# Patient Record
Sex: MALE | Race: WHITE | NOT HISPANIC OR LATINO | Employment: OTHER | URBAN - METROPOLITAN AREA
[De-identification: names, ages, dates, MRNs, and addresses within clinical notes are randomized per-mention and may not be internally consistent; named-entity substitution may affect disease eponyms.]

---

## 2017-01-13 ENCOUNTER — OFFICE VISIT (OUTPATIENT)
Dept: NEUROSURGERY | Facility: CLINIC | Age: 72
End: 2017-01-13

## 2017-01-13 VITALS
WEIGHT: 193 LBS | BODY MASS INDEX: 24 KG/M2 | HEIGHT: 75 IN | SYSTOLIC BLOOD PRESSURE: 117 MMHG | DIASTOLIC BLOOD PRESSURE: 65 MMHG | HEART RATE: 60 BPM

## 2017-01-13 DIAGNOSIS — M48.061 SPINAL STENOSIS OF LUMBAR REGION: Primary | ICD-10-CM

## 2017-01-13 DIAGNOSIS — M51.37 DEGENERATION OF INTERVERTEBRAL DISC OF LUMBOSACRAL REGION: ICD-10-CM

## 2017-01-13 PROCEDURE — 99213 OFFICE O/P EST LOW 20 MIN: CPT | Performed by: NEUROLOGICAL SURGERY

## 2017-01-13 NOTE — LETTER
January 13, 2017     Riky Vincent MD  [address] [place] [id number]    Patient: Jhonatan Norman   YOB: 1945   Date of Visit: 1/13/2017       Dear Dr. Carlton MD:    Jhonatan Norman was in my office today. Below are the relevant portions of my assessment and plan of care.      Independent Review of Radiographic Studies:    The lumbar MRI done 12/14/16 as well as the plain x-rays show some mild degree of progression of stenosis at L4-L5 and L5-S1.  He does have a transitional segment.  I think there is a little bit of progression of stenosis at L3-L4.  I agree with the report.  But not overall extremely different compared to 2012.      Medical Decision Making:    Again nothing surgical at this time.  He will continue exercises and flexibility and core strengthening.  We'll check up on him in 6 months.      Jhonatan was seen today for back pain.    Diagnoses and all orders for this visit:    Spinal stenosis of lumbar region    Degeneration of intervertebral disc of lumbosacral region    Return in about 6 months (around 7/13/2017).            If you have questions, please do not hesitate to call me. I look forward to following Jhonatan along with you.         Sincerely,        Perico Khalil MD        CC: Calos Hyman, DO

## 2017-01-13 NOTE — MR AVS SNAPSHOT
Jhonatan Norman   1/13/2017 9:00 AM   Office Visit    Dept Phone:  [phone]   Encounter #:  [id number]    Provider:  Perico Khalil MD   Department:  [facility] NEUROSURGERY                Your Full Care Plan              Your Updated Medication List          This list is accurate as of: 1/13/17 10:31 AM.  Always use your most recent med list.                JUBLIA 10 % solution   Generic drug:  Efinaconazole       NAPROXEN  MG EC tablet   Generic drug:  naproxen               You Were Diagnosed With        Codes Comments    Spinal stenosis of lumbar region    -  Primary ICD-10-CM: M48.06  ICD-9-CM: 724.02     Degeneration of intervertebral disc of lumbosacral region     ICD-10-CM: M51.37  ICD-9-CM: 722.52       Instructions     None    Patient Instructions History      Upcoming Appointments     Visit Type Date Time Department    FOLLOW UP 1/13/2017  9:00 AM [facility] NEUROSURGERY [facility]    FOLLOW UP 7/10/2017  8:45 AM [facility] NEUROSURGERY [facility]      [facility] Signup     Our records indicate that you have an active [employer] account.    You can view your After Visit Summary by going to [employer] and logging in with your [facility] username and password.  If you don't have a [facility] username and password but a parent or guardian has access to your record, the parent or guardian should login with their own [facility] username and password and access your record to view the After Visit Summary.    If you have questions, you can email [email or web address] or call [phone] to talk to our [facility] staff.  Remember, [facility] is NOT to be used for urgent needs.  For medical emergencies, dial 911.               Other Info from Your Visit           Your Appointments     Jul 10, 2017  8:45 AM EDT   Follow Up with Perico Khalil MD   [facility] NEUROSURGERY (--)    [address] [facility] [address]  [facility] [address]   [phone]           "Arrive 15 minutes prior to appointment.              Allergies     No Known Allergies      Reason for Visit     Back Pain           Vital Signs     Blood Pressure Pulse Height Weight Body Mass Index Smoking Status    117/65 60 75\" (190.5 cm) 193 lb (87.5 kg) 24.12 kg/m2 Never Smoker      Problems and Diagnoses Noted     Degeneration of lumbar or lumbosacral intervertebral disc    Narrowing of spinal canal        "

## 2017-01-13 NOTE — PROGRESS NOTES
Subjective   Patient ID: Jhonatan Norman is a 71 y.o. male is here today for follow-up of back pain with new MRI and x-rays of the lumbar spine.    The patient denies any changes to his symptoms since his last visit.    Back Pain   This is a chronic problem. The current episode started more than 1 month ago. The problem occurs daily. The problem is unchanged.   Neurologic Problem   The patient's primary symptoms include focal weakness. This is a chronic problem. The current episode started more than 1 year ago. The neurological problem developed gradually. The problem has been gradually worsening since onset. There was lower extremity focality noted. Associated symptoms include back pain. The treatment provided significant relief.       The following portions of the patient's history were reviewed and updated as appropriate: allergies, current medications, past family history, past medical history, past social history, past surgical history and problem list.    Review of Systems   Musculoskeletal: Positive for back pain.   Neurological: Positive for focal weakness.   All other systems reviewed and are negative.    He is back to discuss his MRI and plain x-rays.  There is been some degree of progression of her for years but not a lot.  He still has the known stenosis at L4-L5 and L5-S1 without significant listhesis.  I think is a little bit of progression at L3-L4.  He has a transitional segment.  It's not so much pain as it is numbness and tingling particularly in his feet.  He has a chronic gastrocnemius weakness on the left.  There was nothing that suggested a need for any kind of surgery.  I recommended that he continue working on home exercises and strengthening of his legs and core muscles.  We will see him in 6 months.        Objective   Physical Exam   Constitutional: He is oriented to person, place, and time. He appears well-developed and well-nourished.   HENT:   Head: Normocephalic and atraumatic.   Eyes:  Conjunctivae and EOM are normal. Pupils are equal, round, and reactive to light.   Fundoscopic exam:       The right eye shows no papilledema. The right eye shows venous pulsations.        The left eye shows no papilledema. The left eye shows venous pulsations.   Neck: Carotid bruit is not present.   Neurological: He is oriented to person, place, and time. He has a normal Finger-Nose-Finger Test and a normal Heel to Shin Test. Gait normal.   Reflex Scores:       Tricep reflexes are 2+ on the right side and 2+ on the left side.       Bicep reflexes are 2+ on the right side and 2+ on the left side.       Brachioradialis reflexes are 2+ on the right side and 2+ on the left side.       Patellar reflexes are 2+ on the right side and 2+ on the left side.       Achilles reflexes are 2+ on the right side and 2+ on the left side.  Psychiatric: His speech is normal.     Neurologic Exam     Mental Status   Oriented to person, place, and time.   Registration of memory: Good recent and remote memory.   Attention: normal. Concentration: normal.   Speech: speech is normal   Level of consciousness: alert  Knowledge: consistent with education.     Cranial Nerves     CN II   Visual fields full to confrontation.   Visual acuity: normal    CN III, IV, VI   Pupils are equal, round, and reactive to light.  Extraocular motions are normal.     CN V   Facial sensation intact.   Right corneal reflex: normal  Left corneal reflex: normal    CN VII   Facial expression full, symmetric.   Right facial weakness: none  Left facial weakness: none    CN VIII   Hearing: intact    CN IX, X   Palate: symmetric    CN XI   Right sternocleidomastoid strength: normal  Left sternocleidomastoid strength: normal    CN XII   Tongue: not atrophic  Tongue deviation: none    Motor Exam   Muscle bulk: normal  Right arm tone: normal  Left arm tone: normal  Right leg tone: normal  Left leg tone: normal    Strength   Strength 5/5 except as noted.   Left gastroc:  3/5    Sensory Exam   Light touch normal.     Gait, Coordination, and Reflexes     Gait  Gait: normal    Coordination   Finger to nose coordination: normal  Heel to shin coordination: normal    Reflexes   Right brachioradialis: 2+  Left brachioradialis: 2+  Right biceps: 2+  Left biceps: 2+  Right triceps: 2+  Left triceps: 2+  Right patellar: 2+  Left patellar: 2+  Right achilles: 2+  Left achilles: 2+  Right : 2+  Left : 2+      Assessment/Plan   Independent Review of Radiographic Studies:    The lumbar MRI done 12/14/16 as well as the plain x-rays show some mild degree of progression of stenosis at L4-L5 and L5-S1.  He does have a transitional segment.  I think there is a little bit of progression of stenosis at L3-L4.  I agree with the report.  But not overall extremely different compared to 2012.      Medical Decision Making:    Again nothing surgical at this time.  He will continue exercises and flexibility and core strengthening.  We'll check up on him in 6 months.      Jhonatan was seen today for back pain.    Diagnoses and all orders for this visit:    Spinal stenosis of lumbar region    Degeneration of intervertebral disc of lumbosacral region    Return in about 6 months (around 7/13/2017).

## 2017-07-10 ENCOUNTER — OFFICE VISIT (OUTPATIENT)
Dept: NEUROSURGERY | Facility: CLINIC | Age: 72
End: 2017-07-10

## 2017-07-10 VITALS
SYSTOLIC BLOOD PRESSURE: 109 MMHG | HEIGHT: 75 IN | BODY MASS INDEX: 23.5 KG/M2 | HEART RATE: 55 BPM | DIASTOLIC BLOOD PRESSURE: 71 MMHG | WEIGHT: 189 LBS

## 2017-07-10 DIAGNOSIS — M51.37 DEGENERATION OF INTERVERTEBRAL DISC OF LUMBOSACRAL REGION: ICD-10-CM

## 2017-07-10 DIAGNOSIS — M48.061 SPINAL STENOSIS OF LUMBAR REGION: Primary | ICD-10-CM

## 2017-07-10 PROCEDURE — 99213 OFFICE O/P EST LOW 20 MIN: CPT | Performed by: NEUROLOGICAL SURGERY

## 2017-07-10 NOTE — PROGRESS NOTES
Subjective   Patient ID: Jhonatan Norman is a 71 y.o. male is here today for follow-up of back pain.    At the patient's last visit, he was encouraged to continue exercising.    Today, the patient notes that he is stable at this time.  He notes that his pain symptoms are improved since his last visit.  He notes that he has been taking it easy and not doing any heavy lifting.  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    He notes that his upper back pain symptoms have improved as well.    Back Pain   This is a chronic problem. The current episode started more than 1 month ago. The problem occurs daily. The problem has been gradually improving since onset. Pertinent negatives include no fever.       The following portions of the patient's history were reviewed and updated as appropriate: allergies, current medications, past family history, past medical history, past social history, past surgical history and problem list.    Review of Systems   Constitutional: Negative for fever.   Musculoskeletal: Positive for back pain.   Neurological: Negative for syncope.   All other systems reviewed and are negative.    I been following this patient expectantly every so often.  He has known spinal stenosis at L4-L5 and L5-S1 as well as L3-L4.  He has chronic left gastrocnemius weakness.  We have been trying to avoid surgery.  He's been doing exercises and we seem to be reasonably successful in managing this nonoperatively.  He will continue doing his exercises and stretches and I'll see him in 9 months.      Objective   Physical Exam   Constitutional: He is oriented to person, place, and time. He appears well-developed and well-nourished.   HENT:   Head: Normocephalic and atraumatic.   Eyes: Conjunctivae and EOM are normal. Pupils are equal, round, and reactive to light.   Fundoscopic exam:       The right eye shows no papilledema. The right eye shows venous pulsations.        The left eye shows no papilledema. The left eye shows  venous pulsations.   Neck: Carotid bruit is not present.   Neurological: He is oriented to person, place, and time. He has a normal Finger-Nose-Finger Test and a normal Heel to Shin Test. Gait normal.   Reflex Scores:       Tricep reflexes are 2+ on the right side and 2+ on the left side.       Bicep reflexes are 2+ on the right side and 2+ on the left side.       Brachioradialis reflexes are 2+ on the right side and 2+ on the left side.       Patellar reflexes are 2+ on the right side and 2+ on the left side.       Achilles reflexes are 2+ on the right side and 2+ on the left side.  Psychiatric: His speech is normal.     Neurologic Exam     Mental Status   Oriented to person, place, and time.   Registration of memory: Good recent and remote memory.   Attention: normal. Concentration: normal.   Speech: speech is normal   Level of consciousness: alert  Knowledge: consistent with education.     Cranial Nerves     CN II   Visual fields full to confrontation.   Visual acuity: normal    CN III, IV, VI   Pupils are equal, round, and reactive to light.  Extraocular motions are normal.     CN V   Facial sensation intact.   Right corneal reflex: normal  Left corneal reflex: normal    CN VII   Facial expression full, symmetric.   Right facial weakness: none  Left facial weakness: none    CN VIII   Hearing: intact    CN IX, X   Palate: symmetric    CN XI   Right sternocleidomastoid strength: normal  Left sternocleidomastoid strength: normal    CN XII   Tongue: not atrophic  Tongue deviation: none    Motor Exam   Muscle bulk: normal  Right arm tone: normal  Left arm tone: normal  Right leg tone: normal  Left leg tone: normal    Strength   Strength 5/5 except as noted.   Left gastroc: 3/5    Sensory Exam   Light touch normal.     Gait, Coordination, and Reflexes     Gait  Gait: normal    Coordination   Finger to nose coordination: normal  Heel to shin coordination: normal    Reflexes   Right brachioradialis: 2+  Left  brachioradialis: 2+  Right biceps: 2+  Left biceps: 2+  Right triceps: 2+  Left triceps: 2+  Right patellar: 2+  Left patellar: 2+  Right achilles: 2+  Left achilles: 2+  Right : 2+  Left : 2+      Assessment/Plan   Independent Review of Radiographic Studies:    The MRI done 12/14/16 shows spinal stenosis at L4-L5 and L5-S1 and some progression at L3-L4.      Medical Decision Making:    Overall doing reasonably well.  He'll continue his exercises.  Will stretch out the follow-up to 9 months.      Jhonatan was seen today for back pain.    Diagnoses and all orders for this visit:    Spinal stenosis of lumbar region    Degeneration of intervertebral disc of lumbosacral region    Return in about 9 months (around 4/10/2018).

## 2018-01-09 ENCOUNTER — OFFICE VISIT (OUTPATIENT)
Dept: GASTROENTEROLOGY | Facility: CLINIC | Age: 73
End: 2018-01-09

## 2018-01-09 VITALS
SYSTOLIC BLOOD PRESSURE: 112 MMHG | WEIGHT: 190.2 LBS | DIASTOLIC BLOOD PRESSURE: 66 MMHG | TEMPERATURE: 97.5 F | BODY MASS INDEX: 23.65 KG/M2 | HEIGHT: 75 IN

## 2018-01-09 DIAGNOSIS — K21.9 GASTROESOPHAGEAL REFLUX DISEASE, ESOPHAGITIS PRESENCE NOT SPECIFIED: Primary | ICD-10-CM

## 2018-01-09 PROCEDURE — 99202 OFFICE O/P NEW SF 15 MIN: CPT | Performed by: INTERNAL MEDICINE

## 2018-01-09 RX ORDER — SODIUM CHLORIDE, SODIUM LACTATE, POTASSIUM CHLORIDE, CALCIUM CHLORIDE 600; 310; 30; 20 MG/100ML; MG/100ML; MG/100ML; MG/100ML
30 INJECTION, SOLUTION INTRAVENOUS CONTINUOUS
Status: CANCELLED | OUTPATIENT
Start: 2018-01-09

## 2018-01-09 RX ORDER — DEXLANSOPRAZOLE 60 MG/1
60 CAPSULE, DELAYED RELEASE ORAL DAILY
Refills: 1 | COMMUNITY
Start: 2017-12-08 | End: 2018-04-09

## 2018-01-09 RX ORDER — MELATONIN
5000 DAILY
COMMUNITY
End: 2019-01-09

## 2018-01-09 NOTE — PROGRESS NOTES
Chief Complaint   Patient presents with   • Heartburn   • Abdominal Pain        Jhonatan Norman is a  72 y.o. male here for an initial visit for GERD    HPI This 72-year-old white male patient of Dr. Aidan Ross presents with complaints of reflux that he recently attributed to carbonated beverages.  He had been studied with upper endoscopy in February 2013 as well as colonoscopy with evidence of previous vagotomy and pyloroplasty.  He had been treated at that time with Nexium and subsequently Dexilant which did afford significant relief.  He tried over-the-counter equivalent lansoprazole 60 mg which did improve his reflux but since initiating this medication has had cold-like symptoms that may be associated with the medication.  He has completed an 8 week course of this and I offered upper endoscopic evaluation to assess his anatomy and to decide whether stopping his proton pump inhibitor is the appropriate next step to take.  We did talk about alternative methods of treatment i.e. alternative PPI, mucosal defense agent such as Carafate.  He would prefer to have examination prior to making adjustments.    Past Medical History:   Diagnosis Date   • Alcoholism    • GERD (gastroesophageal reflux disease)    • H/O pyloroplasty    • Hx of vagotomy        Current Outpatient Prescriptions   Medication Sig Dispense Refill   • cholecalciferol (VITAMIN D3) 1000 units tablet Take 5,000 Units by mouth Daily.     • CYANOCOBALAMIN IJ Inject  as directed.     • DEXILANT 60 MG capsule   1   • NAPROXEN  MG EC tablet   1     No current facility-administered medications for this visit.        PRN Meds:.    No Known Allergies    Social History     Social History   • Marital status:      Spouse name: N/A   • Number of children: N/A   • Years of education: college graduate     Occupational History   • Retired      Social History Main Topics   • Smoking status: Former Smoker     Packs/day: 2.00     Start date: 1958     Quit  date: 1997   • Smokeless tobacco: Never Used   • Alcohol use No      Comment: history of alcohol abuse   • Drug use: No   • Sexual activity: Not on file     Other Topics Concern   • Not on file     Social History Narrative       Family History   Problem Relation Age of Onset   • Cancer Other    • Ulcers Son    • Irritable bowel syndrome Daughter    • Diverticulitis Daughter        Review of Systems   Constitutional: Negative for activity change, appetite change, fatigue and unexpected weight change.   HENT: Negative for congestion, facial swelling, sore throat, trouble swallowing and voice change.    Eyes: Negative for photophobia and visual disturbance.   Respiratory: Negative for cough and choking.    Cardiovascular: Negative for chest pain.   Gastrointestinal: Negative for abdominal distention, abdominal pain, anal bleeding, blood in stool, constipation, diarrhea, nausea, rectal pain and vomiting.        GERD   Endocrine: Negative for polyphagia.   Musculoskeletal: Negative for arthralgias, gait problem and joint swelling.   Skin: Negative for color change, pallor and rash.   Allergic/Immunologic: Negative for food allergies.   Neurological: Negative for speech difficulty and headaches.   Hematological: Does not bruise/bleed easily.   Psychiatric/Behavioral: Negative for agitation, confusion and sleep disturbance.       Vitals:    01/09/18 0924   BP: 112/66   Temp: 97.5 °F (36.4 °C)       Physical Exam   Constitutional: He is oriented to person, place, and time. He appears well-developed and well-nourished.   HENT:   Head: Normocephalic.   Mouth/Throat: Oropharynx is clear and moist.   Eyes: Conjunctivae and EOM are normal.   Neck: Normal range of motion.   Cardiovascular: Normal rate and regular rhythm.    Pulmonary/Chest: Breath sounds normal.   Abdominal: Soft. Bowel sounds are normal.   Musculoskeletal: Normal range of motion.   Neurological: He is alert and oriented to person, place, and time.   Skin: Skin  is warm and dry.   Psychiatric: He has a normal mood and affect. His behavior is normal.       ASSESSMENT   #1 GERD: Recent exacerbation, better with PPI  #2 cold like symptoms: Possible side effect associated with lansoprazole      PLAN  Schedule EGD      ICD-10-CM ICD-9-CM   1. Gastroesophageal reflux disease, esophagitis presence not specified K21.9 530.81

## 2018-01-17 ENCOUNTER — ANESTHESIA (OUTPATIENT)
Dept: GASTROENTEROLOGY | Facility: HOSPITAL | Age: 73
End: 2018-01-17

## 2018-01-17 ENCOUNTER — ANESTHESIA EVENT (OUTPATIENT)
Dept: GASTROENTEROLOGY | Facility: HOSPITAL | Age: 73
End: 2018-01-17

## 2018-01-17 ENCOUNTER — HOSPITAL ENCOUNTER (OUTPATIENT)
Facility: HOSPITAL | Age: 73
Setting detail: HOSPITAL OUTPATIENT SURGERY
Discharge: HOME | End: 2018-01-17
Attending: INTERNAL MEDICINE | Admitting: INTERNAL MEDICINE

## 2018-01-17 VITALS
HEART RATE: 54 BPM | TEMPERATURE: 98.1 F | WEIGHT: 191.5 LBS | BODY MASS INDEX: 23.81 KG/M2 | OXYGEN SATURATION: 97 % | RESPIRATION RATE: 14 BRPM | DIASTOLIC BLOOD PRESSURE: 72 MMHG | HEIGHT: 75 IN | SYSTOLIC BLOOD PRESSURE: 104 MMHG

## 2018-01-17 DIAGNOSIS — K21.9 GASTROESOPHAGEAL REFLUX DISEASE, ESOPHAGITIS PRESENCE NOT SPECIFIED: ICD-10-CM

## 2018-01-17 PROCEDURE — 43239 EGD BIOPSY SINGLE/MULTIPLE: CPT | Performed by: INTERNAL MEDICINE

## 2018-01-17 PROCEDURE — 87081 CULTURE SCREEN ONLY: CPT | Performed by: INTERNAL MEDICINE

## 2018-01-17 PROCEDURE — 25010000002 PROPOFOL 10 MG/ML EMULSION: Performed by: ANESTHESIOLOGY

## 2018-01-17 PROCEDURE — 88312 SPECIAL STAINS GROUP 1: CPT | Performed by: INTERNAL MEDICINE

## 2018-01-17 PROCEDURE — 88305 TISSUE EXAM BY PATHOLOGIST: CPT | Performed by: INTERNAL MEDICINE

## 2018-01-17 PROCEDURE — S0260 H&P FOR SURGERY: HCPCS | Performed by: INTERNAL MEDICINE

## 2018-01-17 RX ORDER — SODIUM CHLORIDE, SODIUM LACTATE, POTASSIUM CHLORIDE, CALCIUM CHLORIDE 600; 310; 30; 20 MG/100ML; MG/100ML; MG/100ML; MG/100ML
30 INJECTION, SOLUTION INTRAVENOUS CONTINUOUS
Status: DISCONTINUED | OUTPATIENT
Start: 2018-01-17 | End: 2018-01-17 | Stop reason: HOSPADM

## 2018-01-17 RX ORDER — PROPOFOL 10 MG/ML
VIAL (ML) INTRAVENOUS AS NEEDED
Status: DISCONTINUED | OUTPATIENT
Start: 2018-01-17 | End: 2018-01-17 | Stop reason: SURG

## 2018-01-17 RX ORDER — LIDOCAINE HYDROCHLORIDE 20 MG/ML
INJECTION, SOLUTION INFILTRATION; PERINEURAL AS NEEDED
Status: DISCONTINUED | OUTPATIENT
Start: 2018-01-17 | End: 2018-01-17 | Stop reason: SURG

## 2018-01-17 RX ADMIN — LIDOCAINE HYDROCHLORIDE 60 MG: 20 INJECTION, SOLUTION INFILTRATION; PERINEURAL at 16:10

## 2018-01-17 RX ADMIN — PROPOFOL 300 MG: 10 INJECTION, EMULSION INTRAVENOUS at 16:10

## 2018-01-17 RX ADMIN — SODIUM CHLORIDE, POTASSIUM CHLORIDE, SODIUM LACTATE AND CALCIUM CHLORIDE: 600; 310; 30; 20 INJECTION, SOLUTION INTRAVENOUS at 16:10

## 2018-01-17 RX ADMIN — SODIUM CHLORIDE, POTASSIUM CHLORIDE, SODIUM LACTATE AND CALCIUM CHLORIDE 30 ML/HR: 600; 310; 30; 20 INJECTION, SOLUTION INTRAVENOUS at 15:24

## 2018-01-17 NOTE — H&P
[facility]  Pre Procedure History & Physical    Chief Complaint:   GERD    Subjective     HPI:   This 72-year-old presents to the endoscopy suite for upper endoscopic evaluation.  He has a history of reflux.    Past Medical History:   Past Medical History:   Diagnosis Date   • Alcoholism    • GERD (gastroesophageal reflux disease)    • H/O pyloroplasty    • Hx of vagotomy    • Spinal stenosis        Past Surgical History:  Past Surgical History:   Procedure Laterality Date   • BACK SURGERY     • CHOLECYSTECTOMY     • COLONOSCOPY  02/28/2013    fair prep, tics, stool, torts, IH   • ENDOSCOPY  02/28/2013    Z line irregular, 45 cm from incisors, torts, HH, bilious gastric fluiod, gastritis, pylorplasty found, Mora's, reactive gastropathychronic inflammation, esophagitis,    • EYE SURGERY      CATARACTS   • HERNIA REPAIR         Family History:  Family History   Problem Relation Age of Onset   • Cancer Other    • Ulcers Son    • Irritable bowel syndrome Daughter    • Diverticulitis Daughter    • Malig Hyperthermia Neg Hx        Social History:   reports that he quit smoking about 21 years ago. He started smoking about 60 years ago. He smoked 2.00 packs per day. He has never used smokeless tobacco. He reports that he does not drink alcohol or use illicit drugs.    Medications:   Prescriptions Prior to Admission   Medication Sig Dispense Refill Last Dose   • cholecalciferol (VITAMIN D3) 1000 units tablet Take 5,000 Units by mouth Daily.   1/16/2018 at Unknown time   • CYANOCOBALAMIN IJ Inject  as directed Every 30 (Thirty) Days.   1/8/2018   • DEXILANT 60 MG capsule Take 60 mg by mouth Daily.  1 1/17/2018 at Unknown time       Allergies:  Review of patient's allergies indicates no known allergies.    ROS:    Pertinent items are noted in HPI, all other systems reviewed and negative     Objective     Blood pressure 109/68, pulse 58, temperature 97.9 °F (36.6 °C), temperature source Oral, resp.  "rate 16, height 190.5 cm (75\"), weight 86.9 kg (191 lb 8 oz), SpO2 98 %.    Physical Exam   Constitutional: Pt is oriented to person, place, and time and well-developed, well-nourished, and in no distress.   Mouth/Throat: Oropharynx is clear and moist.   Neck: Normal range of motion.   Cardiovascular: Normal rate, regular rhythm and normal heart sounds.    Pulmonary/Chest: Effort normal and breath sounds normal.   Abdominal: Soft. Nontender  Skin: Skin is warm and dry.   Psychiatric: Mood, memory, affect and judgment normal.     Assessment/Plan     Diagnosis:  GERD    Anticipated Surgical Procedure:  EGD    The risks, benefits, and alternatives of this procedure have been discussed with the patient or the responsible party- the patient understands and agrees to proceed.                                                          "

## 2018-01-17 NOTE — DISCHARGE INSTRUCTIONS
Biopsy results will be called to you within a week, if you do not get your results please call Dr. Powell's office at [phone]

## 2018-01-17 NOTE — ANESTHESIA PREPROCEDURE EVALUATION
Anesthesia Evaluation     Patient summary reviewed and Nursing notes reviewed          Airway   Dental      Pulmonary    (+) a smoker Former,   Cardiovascular         Neuro/Psych  GI/Hepatic/Renal/Endo    (+)  GERD,     Musculoskeletal     Abdominal    Substance History      OB/GYN          Other   (+) arthritis                                           Anesthesia Plan    ASA 3     MAC     Anesthetic plan and risks discussed with patient.

## 2018-01-17 NOTE — ANESTHESIA POSTPROCEDURE EVALUATION
"Patient: Jhonatan Norman    Procedure Summary     Date Anesthesia Start Anesthesia Stop Room / Location    01/17/18 [id number]  [facility] /  [facility]       Procedure Diagnosis Surgeon Provider    ESOPHAGOGASTRODUODENOSCOPY with biopsies (N/A Esophagus) Gastroesophageal reflux disease, esophagitis presence not specified  (Gastroesophageal reflux disease, esophagitis presence not specified [K21.9]) MD Calos Vitale MD          Anesthesia Type: MAC  Last vitals  BP   109/68 (01/17/18 1516)   Temp   36.6 °C (97.9 °F) (01/17/18 1516)   Pulse   58 (01/17/18 1516)   Resp   16 (01/17/18 1516)     SpO2   98 % (01/17/18 1516)     Post Anesthesia Care and Evaluation    Patient location during evaluation: PACU  Patient participation: complete - patient participated  Level of consciousness: awake and alert  Pain management: adequate  Airway patency: patent  Anesthetic complications: No anesthetic complications    Cardiovascular status: acceptable  Respiratory status: acceptable  Hydration status: acceptable    Comments: /68 (BP Location: Left arm, Patient Position: Sitting)  Pulse 58  Temp 36.6 °C (97.9 °F) (Oral)   Resp 16  Ht 190.5 cm (75\")  Wt 86.9 kg (191 lb 8 oz)  SpO2 98%  BMI 23.94 kg/m2      "

## 2018-01-17 NOTE — PLAN OF CARE
Problem: Patient Care Overview (Adult)  Goal: Plan of Care Review  Outcome: Ongoing (interventions implemented as appropriate)   01/17/18 1507   Coping/Psychosocial Response Interventions   Plan Of Care Reviewed With patient   Patient Care Overview   Progress improving     Goal: Adult Individualization and Mutuality  Outcome: Ongoing (interventions implemented as appropriate)    Goal: Discharge Needs Assessment  Outcome: Ongoing (interventions implemented as appropriate)   01/17/18 1507   Discharge Needs Assessment   Concerns To Be Addressed no discharge needs identified   Discharge Disposition home or self-care   Living Environment   Transportation Available car;family or friend will provide       Problem: GI Endoscopy (Adult)  Intervention: Monitor/Manage Procedure Recovery   01/17/18 1507   Respiratory Interventions   Airway/Ventilation Management airway patency maintained   Coping/Psychosocial Interventions   Environmental Support calm environment promoted   Activity   Activity Type activity adjusted per tolerance   Cardiac Interventions   Warming Thermoregulation Maintenance warm blankets applied     Intervention: Prevent Bharati-procedural Injury   01/17/18 1507   Positioning   Positioning side lying, left   Head Of Bed (HOB) Position HOB elevated       Goal: Signs and Symptoms of Listed Potential Problems Will be Absent or Manageable (GI Endoscopy)  Outcome: Ongoing (interventions implemented as appropriate)   01/17/18 1507   GI Endoscopy   Problems Assessed (GI Endoscopy) all   Problems Present (GI Endoscopy) none

## 2018-01-18 LAB — UREASE TISS QL: NEGATIVE

## 2018-01-19 LAB
CYTO UR: NORMAL
LAB AP CASE REPORT: NORMAL
Lab: NORMAL
PATH REPORT.FINAL DX SPEC: NORMAL
PATH REPORT.GROSS SPEC: NORMAL

## 2018-01-24 ENCOUNTER — TELEPHONE (OUTPATIENT)
Dept: GASTROENTEROLOGY | Facility: CLINIC | Age: 73
End: 2018-01-24

## 2018-01-24 NOTE — TELEPHONE ENCOUNTER
----- Message from Jhonatan MONTEIRO MD sent at 1/19/2018  3:25 PM EST -----  Regarding: Biopsy results  Okay to call results, recommend if still symptomatic, can switch to alternative PPI, or initiate Carafate 1 g before meals and at bedtime along with current PPI.  Patient can follow-up to discuss as he wishes.  ----- Message -----     From: Lab, Background User     Sent: 1/18/2018   7:58 PM       To: Jhonatan MONTEIRO MD

## 2018-01-24 NOTE — TELEPHONE ENCOUNTER
Call to pt.  Advise per path report that duodenal bx unremarkable.  Stomach body with mild chronic gastritis, no H pylori.  GE junction with mild chronic inflammation, negative for fungi, negative for Mora's.    Advise per DR Powell that recommend if still symptomatic, can switch to alternative PPI, or initiate Carafate before meals and at bedtime along with current PPI.  Can f/u to discuss as wishes.    Pt verb understanding.  States is almost symptom free - will think about this and call back.

## 2018-02-02 ENCOUNTER — TELEPHONE (OUTPATIENT)
Dept: GASTROENTEROLOGY | Facility: CLINIC | Age: 73
End: 2018-02-02

## 2018-02-02 NOTE — TELEPHONE ENCOUNTER
Call from pt.  States is following up on call from 1/24.  Reports had episode yesterday of epigastric discomfort accompanied by frequent belching  after drinking coffee.  Many questions re: changing PPI vs adding Carafate.  Concerns re: long term PPI.  Questions re: prn Pepcid.      Advise pt that may f/u to discuss as wishes.  Appt scheduled with DR Powell for 2/5/18 @ 1600.      Evangelist BEAL MA, notified of add on.

## 2018-02-05 ENCOUNTER — OFFICE VISIT (OUTPATIENT)
Dept: GASTROENTEROLOGY | Facility: CLINIC | Age: 73
End: 2018-02-05

## 2018-02-05 VITALS
SYSTOLIC BLOOD PRESSURE: 90 MMHG | DIASTOLIC BLOOD PRESSURE: 60 MMHG | TEMPERATURE: 98.1 F | BODY MASS INDEX: 23.62 KG/M2 | WEIGHT: 190 LBS | HEIGHT: 75 IN

## 2018-02-05 DIAGNOSIS — K21.00 GASTROESOPHAGEAL REFLUX DISEASE WITH ESOPHAGITIS: Primary | ICD-10-CM

## 2018-02-05 PROCEDURE — 99214 OFFICE O/P EST MOD 30 MIN: CPT | Performed by: INTERNAL MEDICINE

## 2018-02-05 RX ORDER — SUCRALFATE ORAL 1 G/10ML
1 SUSPENSION ORAL 4 TIMES DAILY
Qty: 1200 ML | Refills: 5 | Status: SHIPPED | OUTPATIENT
Start: 2018-02-05 | End: 2018-04-09

## 2018-02-05 NOTE — PROGRESS NOTES
Chief Complaint   Patient presents with   • Abdominal Pain     Epigastric, indigestion        Jhonatan Norman is a  72 y.o. male here for a follow up visit for GERD    HPI This 72-year-old white male patient of Dr. Aidan Ross returns in follow-up since his endoscopic evaluation performed on January 17.  Upper endoscopy revealed gastritis, a pyloroplasty, and otherwise normal appearance.  Biopsies did reflect a mild chronic gastritis and mild esophagitis.  He has been using lansoprazole but still has occasional breakthrough symptoms.  We talked about dietary influences and options for further treatment including addition of mucosal defense agent such as Carafate suspension 1 g with meals and at bedtime.  He would like to try this and a prescription will be called for this product.  He will call with a progress report in 4-6 weeks.  He is does note occasional epigastric discomfort especially with ingestion of caffeine.  Given his history of vagotomy and pyloroplasty, I suspect he will continue to have symptoms to some degree.    Past Medical History:   Diagnosis Date   • Alcoholism    • Colon polyp    • GERD (gastroesophageal reflux disease)    • H/O pyloroplasty    • Hernia 15+ years ago    Surgically repaired   • Hx of vagotomy    • Lactose intolerance    • Spinal stenosis        Current Outpatient Prescriptions   Medication Sig Dispense Refill   • cholecalciferol (VITAMIN D3) 1000 units tablet Take 5,000 Units by mouth Daily.     • CYANOCOBALAMIN IJ Inject  as directed Every 30 (Thirty) Days.     • DEXILANT 60 MG capsule Take 60 mg by mouth Daily.  1     No current facility-administered medications for this visit.        PRN Meds:.    No Known Allergies    Social History     Social History   • Marital status:      Spouse name: N/A   • Number of children: N/A   • Years of education: college graduate     Occupational History   • Retired      Social History Main Topics   • Smoking status: Heavy Tobacco Smoker      Packs/day: 2.00     Years: 10.00     Start date: 1960     Last attempt to quit: 1997   • Smokeless tobacco: Never Used      Comment: Smoked off and on stopped long ago   • Alcohol use Yes      Comment: No alcohol for 20 years   • Drug use: No   • Sexual activity: Yes     Partners: Female      Comment: Active is a relative term     Other Topics Concern   • Not on file     Social History Narrative       Family History   Problem Relation Age of Onset   • Cancer Other    • Ulcers Son    • Irritable bowel syndrome Daughter    • Diverticulitis Daughter    • Alcohol abuse Father    • Malig Hyperthermia Neg Hx        Review of Systems   Constitutional: Negative for activity change, appetite change, fatigue and unexpected weight change.   HENT: Negative for congestion, facial swelling, sore throat, trouble swallowing and voice change.    Eyes: Negative for photophobia and visual disturbance.   Respiratory: Negative for cough and choking.    Cardiovascular: Negative for chest pain.   Gastrointestinal: Positive for abdominal pain. Negative for abdominal distention, anal bleeding, blood in stool, constipation, diarrhea, nausea, rectal pain and vomiting.        GERD   Endocrine: Negative for polyphagia.   Musculoskeletal: Negative for arthralgias, gait problem and joint swelling.   Skin: Negative for color change, pallor and rash.   Allergic/Immunologic: Negative for food allergies.   Neurological: Negative for speech difficulty and headaches.   Hematological: Does not bruise/bleed easily.   Psychiatric/Behavioral: Negative for agitation, confusion and sleep disturbance.       Vitals:    02/05/18 1555   BP: 90/60   Temp: 98.1 °F (36.7 °C)       Physical Exam   Constitutional: He is oriented to person, place, and time. He appears well-developed and well-nourished.   HENT:   Head: Normocephalic.   Mouth/Throat: Oropharynx is clear and moist.   Eyes: Conjunctivae and EOM are normal.   Neck: Normal range of motion.    Cardiovascular: Normal rate and regular rhythm.    Pulmonary/Chest: Breath sounds normal.   Abdominal: Soft. Bowel sounds are normal.   Musculoskeletal: Normal range of motion.   Neurological: He is alert and oriented to person, place, and time.   Skin: Skin is warm and dry.   Psychiatric: He has a normal mood and affect. His behavior is normal.       ASSESSMENT  #1 GERD: Still symptomatic despite PPI      PLAN  Initiate Carafate suspension 1 g with meals and at bedtime  Patient to call with progress report in 4-6 weeks      ICD-10-CM ICD-9-CM   1. Gastroesophageal reflux disease with esophagitis K21.0 530.11

## 2018-02-19 ENCOUNTER — TELEPHONE (OUTPATIENT)
Dept: GASTROENTEROLOGY | Facility: CLINIC | Age: 73
End: 2018-02-19

## 2018-02-19 NOTE — TELEPHONE ENCOUNTER
Although since of smell is not listed under adverse effects from lansoprazole, if he is experiencing sinus congestion  (this is listed) this may influence his ability to smell.  Would try stopping lansoprazole to see if his symptoms resolve cannot comment on whether his lack of smell is likely to recover.  He may need to consider ENT evaluation if symptoms persist.

## 2018-02-19 NOTE — TELEPHONE ENCOUNTER
----- Message from Jhonatan Ester sent at 2/19/2018 10:54 AM EST -----  Regarding: Non-Urgent Medical Question  Contact: [phone]  I have been taking 60 mg/day of Lansoprazole since Nov. 3, two months under brand name Dexalant and the balance a generic from [employer]. I've been congested from the beginning.  I am also taking 10g Carafate 4 times a day for the last 10 days or so. I have noticed recently my sense of smell is greatly diminished. Its not gone but is fairly faint.  I don't actually know when it started-gradual I assume so not registering. I have not noticed that as being a side-effect in the literature on either drug.  I did  see loss of taste as a side effect for lansoprazole but I can still taste although that may be a bit diminished as well.  So, question is whether or not lansoprazole could be the cause and if so, is the sense of smell likely to recover?  Thanks,  Sarthak Norman  [phone]

## 2018-02-19 NOTE — TELEPHONE ENCOUNTER
Call to pt.  Advise per DR Powell that although sense of smell is not listed under adverse effects from lansoprazole, if experiencing sinus congestion (this is listed) this may influence ability to smell.  Would try stopping lansoprazole to see if symptoms resolve. Cannot comment on whether lack of smell is likely to recover.  May need to consider ENT eval if symptoms persist.  Pt verb understanding.

## 2018-03-01 ENCOUNTER — TELEPHONE (OUTPATIENT)
Dept: GASTROENTEROLOGY | Facility: CLINIC | Age: 73
End: 2018-03-01

## 2018-03-01 NOTE — TELEPHONE ENCOUNTER
"----- Message from Jhonatan [place] sent at 3/1/2018 11:36 AM EST -----  Regarding: Visit Follow-Up Question  Contact: [phone]  Dr. Powell suggested I follow up in 4 to 6 weeks after my last visit.  I can come in if you wish.  I stopped taking the lamsoprazole (60 mg/day Previcid) after our last communication re loss of sense of smell.  I'm seeing Dr. Weaver tomorrow morning.  I kept taking the Carafate 10 mg 4 times a day and thought it was helping.  I started having a lot of trouble with constipation and stopped taking it two days ago and am getting back to normal.  My \"ulcer\" symptoms are MUCH better, best since October, but not completely gone (I recognize that could be steady state).  Pretty much eating a normal diet. I've taken 2/3 of the prescription for Carafate.  My question is, is there something I can safely take with the balance of the Carafate to counter the constipation or just drop it for now and see how I do.  Thanks, Sarthak Norman  [phone]  "

## 2018-03-01 NOTE — TELEPHONE ENCOUNTER
Patient has option of trying Carafate and a maintenance dose of 10 mg twice daily, or using H2 blockers such as Zantac 150 mg twice daily to avoid PPIs.  Would offer either option.

## 2018-03-02 ENCOUNTER — TRANSCRIBE ORDERS (OUTPATIENT)
Dept: ADMINISTRATIVE | Facility: HOSPITAL | Age: 73
End: 2018-03-02

## 2018-03-02 ENCOUNTER — LAB (OUTPATIENT)
Dept: LAB | Facility: HOSPITAL | Age: 73
End: 2018-03-02
Attending: OTOLARYNGOLOGY

## 2018-03-02 DIAGNOSIS — G47.00 HYPOSOMNIA: ICD-10-CM

## 2018-03-02 DIAGNOSIS — G47.00 HYPOSOMNIA: Primary | ICD-10-CM

## 2018-03-02 PROCEDURE — 36415 COLL VENOUS BLD VENIPUNCTURE: CPT

## 2018-03-02 PROCEDURE — 84630 ASSAY OF ZINC: CPT

## 2018-03-02 NOTE — TELEPHONE ENCOUNTER
VM to pt - identifies as Sarthak Norman.  Message left per DR Powell that has option of trying Carafate at a maintenance dose of 10 mg twice daily, or using H2 blocker such as Zantac 150 mg twice daily to avoid PPI's.  May use either option.  Contact office if any questions.

## 2018-03-07 LAB — ZINC SERPL-MCNC: 87 UG/DL (ref 56–134)

## 2018-04-06 NOTE — PROGRESS NOTES
Subjective   Patient ID: Jhonatan Norman is a 72 y.o. male is here today for follow-up on back pain.    At the last visit the patient reported that his symptoms were stable.    Today the patient reports that he has noticed more weakness in his feet and he has been stumbling more. He still gets pain in his legs and feet with prolonged standing.    Back Pain   This is a chronic problem. The current episode started more than 1 year ago. The problem occurs daily. The problem is unchanged. Associated symptoms include leg pain and weakness. Pertinent negatives include no bladder incontinence or bowel incontinence.       The following portions of the patient's history were reviewed and updated as appropriate: allergies, current medications, past family history, past medical history, past social history, past surgical history and problem list.    Review of Systems   Gastrointestinal: Negative for bowel incontinence.   Genitourinary: Negative for bladder incontinence.   Musculoskeletal: Positive for back pain.   Neurological: Positive for weakness.   All other systems reviewed and are negative.    I been following this patient for spinal stenosis from L4 to S1.  At some point he will probably need to be fused.  He has a chronically weak left gastrocnemius and generally has been doing well.  He works out at [employer] and I see him there frequently.  We went over some exercise guidelines.  We'll just follow him and see him in one year.      Objective   Physical Exam   Constitutional: He is oriented to person, place, and time. He appears well-developed and well-nourished.   HENT:   Head: Normocephalic and atraumatic.   Eyes: Conjunctivae and EOM are normal. Pupils are equal, round, and reactive to light.   Fundoscopic exam:       The right eye shows no papilledema. The right eye shows venous pulsations.        The left eye shows no papilledema. The left eye shows venous pulsations.   Neck: Carotid bruit is not present.    Neurological: He is oriented to person, place, and time. He has a normal Finger-Nose-Finger Test and a normal Heel to Shin Test. Gait normal.   Reflex Scores:       Tricep reflexes are 2+ on the right side and 2+ on the left side.       Bicep reflexes are 2+ on the right side and 2+ on the left side.       Brachioradialis reflexes are 2+ on the right side and 2+ on the left side.       Patellar reflexes are 2+ on the right side and 2+ on the left side.       Achilles reflexes are 2+ on the right side and 2+ on the left side.  Psychiatric: His speech is normal.     Neurologic Exam     Mental Status   Oriented to person, place, and time.   Registration of memory: Good recent and remote memory.   Attention: normal. Concentration: normal.   Speech: speech is normal   Level of consciousness: alert  Knowledge: consistent with education.     Cranial Nerves     CN II   Visual fields full to confrontation.   Visual acuity: normal    CN III, IV, VI   Pupils are equal, round, and reactive to light.  Extraocular motions are normal.     CN V   Facial sensation intact.   Right corneal reflex: normal  Left corneal reflex: normal    CN VII   Facial expression full, symmetric.   Right facial weakness: none  Left facial weakness: none    CN VIII   Hearing: intact    CN IX, X   Palate: symmetric    CN XI   Right sternocleidomastoid strength: normal  Left sternocleidomastoid strength: normal    CN XII   Tongue: not atrophic  Tongue deviation: none    Motor Exam   Muscle bulk: normal  Right arm tone: normal  Left arm tone: normal  Right leg tone: normal  Left leg tone: normal    Strength   Strength 5/5 except as noted.     Sensory Exam   Light touch normal.     Gait, Coordination, and Reflexes     Gait  Gait: normal    Coordination   Finger to nose coordination: normal  Heel to shin coordination: normal    Reflexes   Right brachioradialis: 2+  Left brachioradialis: 2+  Right biceps: 2+  Left biceps: 2+  Right triceps: 2+  Left  triceps: 2+  Right patellar: 2+  Left patellar: 2+  Right achilles: 2+  Left achilles: 2+  Right : 2+  Left : 2+      Assessment/Plan   Independent Review of Radiographic Studies:      Medical Decision Making:    Generally stable.  He will continue his exercise program and I will see him in one year.      Jhonatan was seen today for back pain.    Diagnoses and all orders for this visit:    Spinal stenosis of lumbar region with neurogenic claudication    Degeneration of intervertebral disc of lumbosacral region      Return in about 1 year (around 4/9/2019).

## 2018-04-09 ENCOUNTER — OFFICE VISIT (OUTPATIENT)
Dept: NEUROSURGERY | Facility: CLINIC | Age: 73
End: 2018-04-09

## 2018-04-09 VITALS
BODY MASS INDEX: 23.62 KG/M2 | WEIGHT: 190 LBS | HEART RATE: 63 BPM | DIASTOLIC BLOOD PRESSURE: 61 MMHG | HEIGHT: 75 IN | SYSTOLIC BLOOD PRESSURE: 98 MMHG

## 2018-04-09 DIAGNOSIS — M48.062 SPINAL STENOSIS OF LUMBAR REGION WITH NEUROGENIC CLAUDICATION: Primary | ICD-10-CM

## 2018-04-09 DIAGNOSIS — M51.37 DEGENERATION OF INTERVERTEBRAL DISC OF LUMBOSACRAL REGION: ICD-10-CM

## 2018-04-09 PROCEDURE — 99212 OFFICE O/P EST SF 10 MIN: CPT | Performed by: NEUROLOGICAL SURGERY

## 2018-04-09 RX ORDER — RANITIDINE 150 MG/1
150 TABLET ORAL 2 TIMES DAILY
COMMUNITY
End: 2019-01-09

## 2018-04-24 ENCOUNTER — TELEPHONE (OUTPATIENT)
Dept: GASTROENTEROLOGY | Facility: CLINIC | Age: 73
End: 2018-04-24

## 2018-04-24 NOTE — TELEPHONE ENCOUNTER
No limited as to how long he can take Zantac, since he is trying to avoid PPIs could offer alternative such as Tagamet or Pepcid but would still be twice daily and either regular strength or double strength..Doubt he is ever going to be symptom-free given his surgical history.  If he has further questions would advise follow-up in office to see me or nurse practitioner.

## 2018-04-24 NOTE — TELEPHONE ENCOUNTER
"----- Message from Jhonatan Norman sent at 4/24/2018  2:44 PM EDT -----  Regarding: Visit Follow-Up Question  Contact: [phone]  Last email  I was switched from Previcid to Zantac 150 mg 2/day & reduce carafate to 10 mg 2/day.  I dropped to 0 Carafate due to constipation.    I had grey stools w/BM but that has been normal since.  Was doing better with indigestion and pain but having intermittent bouts of problems and have been having quite a bit of trouble the last week.  I started having some pain in my right of center abdomen about waist level at the same time.   My questions are: should I communicate this way or come in?;how long can I stay on Zantac; is there anything else I should try?  As info, I did get \"smell test\" with Dr. Weaver including bloodwork and brain MRI and all was normal. I can smell a little.  Thanks,  Sarthak Norman  "

## 2018-04-25 NOTE — TELEPHONE ENCOUNTER
Called pt and advised per Dr Powell that there is no limit as to how long he can take zantac, cince he is trying to avoid ppis could offer alternative such as tagamet or pepcid, but would be twice daily and either regular strength or double strength.  He doubts he will ever be symptoms free given his surgical hx.  If he has further question would advise f/u in office to discuss. Pt verb understanding.

## 2018-08-06 ENCOUNTER — OFFICE VISIT (OUTPATIENT)
Dept: NEUROSURGERY | Facility: CLINIC | Age: 73
End: 2018-08-06

## 2018-08-06 VITALS — HEART RATE: 58 BPM | HEIGHT: 75 IN | SYSTOLIC BLOOD PRESSURE: 107 MMHG | DIASTOLIC BLOOD PRESSURE: 61 MMHG

## 2018-08-06 DIAGNOSIS — M48.062 SPINAL STENOSIS OF LUMBAR REGION WITH NEUROGENIC CLAUDICATION: Primary | ICD-10-CM

## 2018-08-06 PROCEDURE — 99213 OFFICE O/P EST LOW 20 MIN: CPT | Performed by: NURSE PRACTITIONER

## 2018-08-06 RX ORDER — AZELASTINE HYDROCHLORIDE AND FLUTICASONE PROPIONATE 137; 50 UG/1; UG/1
SPRAY, METERED NASAL
Refills: 6 | COMMUNITY
Start: 2018-07-09 | End: 2019-01-09

## 2018-08-06 RX ORDER — METHYLPREDNISOLONE 4 MG/1
TABLET ORAL
Qty: 1 EACH | Refills: 0 | Status: SHIPPED | OUTPATIENT
Start: 2018-08-06 | End: 2019-01-09

## 2018-08-06 RX ORDER — CETIRIZINE HYDROCHLORIDE 10 MG/1
TABLET ORAL
COMMUNITY
End: 2019-01-09

## 2018-11-28 ENCOUNTER — APPOINTMENT (OUTPATIENT)
Dept: PREADMISSION TESTING | Facility: HOSPITAL | Age: 73
End: 2018-11-28

## 2019-01-09 ENCOUNTER — APPOINTMENT (OUTPATIENT)
Dept: PREADMISSION TESTING | Facility: HOSPITAL | Age: 74
End: 2019-01-09

## 2019-01-09 VITALS
HEART RATE: 57 BPM | WEIGHT: 188 LBS | TEMPERATURE: 98.1 F | OXYGEN SATURATION: 97 % | RESPIRATION RATE: 20 BRPM | BODY MASS INDEX: 23.38 KG/M2 | DIASTOLIC BLOOD PRESSURE: 66 MMHG | HEIGHT: 75 IN | SYSTOLIC BLOOD PRESSURE: 105 MMHG

## 2019-01-09 LAB
ALBUMIN SERPL-MCNC: 4.1 G/DL (ref 3.5–5.2)
ALBUMIN/GLOB SERPL: 2 G/DL
ALP SERPL-CCNC: 83 U/L (ref 39–117)
ALT SERPL W P-5'-P-CCNC: 27 U/L (ref 1–41)
ANION GAP SERPL CALCULATED.3IONS-SCNC: 9.7 MMOL/L
AST SERPL-CCNC: 34 U/L (ref 1–40)
BILIRUB SERPL-MCNC: 0.6 MG/DL (ref 0.1–1.2)
BUN BLD-MCNC: 21 MG/DL (ref 8–23)
BUN/CREAT SERPL: 18.8 (ref 7–25)
CALCIUM SPEC-SCNC: 9.5 MG/DL (ref 8.6–10.5)
CHLORIDE SERPL-SCNC: 103 MMOL/L (ref 98–107)
CO2 SERPL-SCNC: 27.3 MMOL/L (ref 22–29)
CREAT BLD-MCNC: 1.12 MG/DL (ref 0.76–1.27)
DEPRECATED RDW RBC AUTO: 41 FL (ref 37–54)
ERYTHROCYTE [DISTWIDTH] IN BLOOD BY AUTOMATED COUNT: 12 % (ref 11.5–14.5)
GFR SERPL CREATININE-BSD FRML MDRD: 64 ML/MIN/1.73
GLOBULIN UR ELPH-MCNC: 2.1 GM/DL
GLUCOSE BLD-MCNC: 102 MG/DL (ref 65–99)
HCT VFR BLD AUTO: 43.3 % (ref 40.4–52.2)
HGB BLD-MCNC: 15.1 G/DL (ref 13.7–17.6)
MCH RBC QN AUTO: 32.9 PG (ref 27–32.7)
MCHC RBC AUTO-ENTMCNC: 34.9 G/DL (ref 32.6–36.4)
MCV RBC AUTO: 94.3 FL (ref 79.8–96.2)
PLATELET # BLD AUTO: 132 10*3/MM3 (ref 140–500)
PMV BLD AUTO: 9.4 FL (ref 6–12)
POTASSIUM BLD-SCNC: 4.6 MMOL/L (ref 3.5–5.2)
PROT SERPL-MCNC: 6.2 G/DL (ref 6–8.5)
RBC # BLD AUTO: 4.59 10*6/MM3 (ref 4.6–6)
SODIUM BLD-SCNC: 140 MMOL/L (ref 136–145)
WBC NRBC COR # BLD: 7.36 10*3/MM3 (ref 4.5–10.7)

## 2019-01-09 PROCEDURE — 93010 ELECTROCARDIOGRAM REPORT: CPT | Performed by: INTERNAL MEDICINE

## 2019-01-09 PROCEDURE — 85027 COMPLETE CBC AUTOMATED: CPT | Performed by: OTOLARYNGOLOGY

## 2019-01-09 PROCEDURE — 93005 ELECTROCARDIOGRAM TRACING: CPT

## 2019-01-09 PROCEDURE — 80053 COMPREHEN METABOLIC PANEL: CPT | Performed by: OTOLARYNGOLOGY

## 2019-01-09 PROCEDURE — 36415 COLL VENOUS BLD VENIPUNCTURE: CPT

## 2019-01-09 RX ORDER — RANITIDINE HCL 75 MG
75 TABLET ORAL EVERY MORNING
COMMUNITY
End: 2019-10-25

## 2019-01-09 NOTE — DISCHARGE INSTRUCTIONS
Take the following medications the morning of surgery with a small sip of water:        General Instructions:  • Do not eat solid food after midnight the night before surgery.  • You may drink clear liquids day of surgery but must stop at least one hour before your hospital arrival time.  • It is beneficial for you to have a clear drink that contains carbohydrates the day of surgery.  We suggest a 12 to 20 ounce bottle of Gatorade or Powerade for non-diabetic patients or a 12 to 20 ounce bottle of G2 or Powerade Zero for diabetic patients. (Pediatric patients, are not advised to drink a 12 to 20 ounce carbohydrate drink)    Clear liquids are liquids you can see through.  Nothing red in color.     Plain water                               Sports drinks  Sodas                                   Gelatin (Jell-O)  Fruit juices without pulp such as white grape juice and apple juice  Popsicles that contain no fruit or yogurt  Tea or coffee (no cream or milk added)  Gatorade / Powerade  G2 / Powerade Zero    • Infants may have breast milk up to four hours before surgery.  • Infants drinking formula may drink formula up to six hours before surgery.   • Patients who avoid smoking, chewing tobacco and alcohol for 4 weeks prior to surgery have a reduced risk of post-operative complications.  Quit smoking as many days before surgery as you can.  • Do not smoke, use chewing tobacco or drink alcohol the day of surgery.   • If applicable bring your C-PAP/ BI-PAP machine.  • Bring any papers given to you in the doctor’s office.  • Wear clean comfortable clothes and socks.  • Do not wear contact lenses or make-up.  Bring a case for your glasses.   • Bring crutches or walker if applicable.  • Remove all piercings.  Leave jewelry and any other valuables at home.  • Hair extensions with metal clips must be removed prior to surgery.  • The Pre-Admission Testing nurse will instruct you to bring medications if unable to obtain an accurate  list in Pre-Admission Testing.        If you were given a blood bank ID arm band remember to bring it with you the day of surgery.    Preventing a Surgical Site Infection:  • For 2 to 3 days before surgery, avoid shaving with a razor because the razor can irritate skin and make it easier to develop an infection.    • Any areas of open skin can increase the risk of a post-operative wound infection by allowing bacteria to enter and travel throughout the body.  Notify your surgeon if you have any skin wounds / rashes even if it is not near the expected surgical site.  The area will need assessed to determine if surgery should be delayed until it is healed.  • The night prior to surgery sleep in a clean bed with clean clothing.  Do not allow pets to sleep with you.  • Shower on the morning of surgery using a fresh bar of anti-bacterial soap (such as Dial) and clean washcloth.  Dry with a clean towel and dress in clean clothing.  • Ask your surgeon if you will be receiving antibiotics prior to surgery.  • Make sure you, your family, and all healthcare providers clean their hands with soap and water or an alcohol based hand  before caring for you or your wound.    Day of surgery:1/16/19   Hospital to call with time of arrival  Upon arrival, a Pre-op nurse and Anesthesiologist will review your health history, obtain vital signs, and answer questions you may have.  The only belongings needed at this time will be your home medications and if applicable your C-PAP/BI-PAP machine.  If you are staying overnight your family can leave the rest of your belongings in the car and bring them to your room later.  A Pre-op nurse will start an IV and you may receive medication in preparation for surgery, including something to help you relax.  Your family will be able to see you in the Pre-op area.  While you are in surgery your family should notify the waiting room  if they leave the waiting room area and provide a  contact phone number.    Please be aware that surgery does come with discomfort.  We want to make every effort to control your discomfort so please discuss any uncontrolled symptoms with your nurse.   Your doctor will most likely have prescribed pain medications.      If you are going home after surgery you will receive individualized written care instructions before being discharged.  A responsible adult must drive you to and from the hospital on the day of your surgery and stay with you for 24 hours.    If you are staying overnight following surgery, you will be transported to your hospital room following the recovery period.  [facility] has all private rooms.    You have received a list of surgical assistants for your reference.  If you have any questions please call Pre-Admission Testing at [phone].  Deductibles and co-payments are collected on the day of service. Please be prepared to pay the required co-pay, deductible or deposit on the day of service as defined by your plan.

## 2019-02-14 ENCOUNTER — APPOINTMENT (OUTPATIENT)
Dept: PREADMISSION TESTING | Facility: HOSPITAL | Age: 74
End: 2019-02-14

## 2019-02-14 VITALS
WEIGHT: 189 LBS | TEMPERATURE: 97.7 F | RESPIRATION RATE: 18 BRPM | BODY MASS INDEX: 23.5 KG/M2 | SYSTOLIC BLOOD PRESSURE: 112 MMHG | HEIGHT: 75 IN | OXYGEN SATURATION: 97 % | DIASTOLIC BLOOD PRESSURE: 71 MMHG | HEART RATE: 53 BPM

## 2019-02-14 LAB
ALBUMIN SERPL-MCNC: 4.1 G/DL (ref 3.5–5.2)
ALBUMIN/GLOB SERPL: 1.6 G/DL
ALP SERPL-CCNC: 98 U/L (ref 39–117)
ALT SERPL W P-5'-P-CCNC: 31 U/L (ref 1–41)
ANION GAP SERPL CALCULATED.3IONS-SCNC: 10.1 MMOL/L
AST SERPL-CCNC: 31 U/L (ref 1–40)
BILIRUB SERPL-MCNC: 0.7 MG/DL (ref 0.1–1.2)
BUN BLD-MCNC: 25 MG/DL (ref 8–23)
BUN/CREAT SERPL: 22.3 (ref 7–25)
CALCIUM SPEC-SCNC: 9.4 MG/DL (ref 8.6–10.5)
CHLORIDE SERPL-SCNC: 104 MMOL/L (ref 98–107)
CO2 SERPL-SCNC: 26.9 MMOL/L (ref 22–29)
CREAT BLD-MCNC: 1.12 MG/DL (ref 0.76–1.27)
DEPRECATED RDW RBC AUTO: 40.8 FL (ref 37–54)
ERYTHROCYTE [DISTWIDTH] IN BLOOD BY AUTOMATED COUNT: 11.9 % (ref 12.3–15.4)
GFR SERPL CREATININE-BSD FRML MDRD: 64 ML/MIN/1.73
GLOBULIN UR ELPH-MCNC: 2.5 GM/DL
GLUCOSE BLD-MCNC: 99 MG/DL (ref 65–99)
HCT VFR BLD AUTO: 43.8 % (ref 37.5–51)
HGB BLD-MCNC: 14.7 G/DL (ref 13–17.7)
MCH RBC QN AUTO: 31.4 PG (ref 26.6–33)
MCHC RBC AUTO-ENTMCNC: 33.6 G/DL (ref 31.5–35.7)
MCV RBC AUTO: 93.6 FL (ref 79–97)
PLATELET # BLD AUTO: 153 10*3/MM3 (ref 140–450)
PMV BLD AUTO: 9.6 FL (ref 6–12)
POTASSIUM BLD-SCNC: 3.9 MMOL/L (ref 3.5–5.2)
PROT SERPL-MCNC: 6.6 G/DL (ref 6–8.5)
RBC # BLD AUTO: 4.68 10*6/MM3 (ref 4.14–5.8)
SODIUM BLD-SCNC: 141 MMOL/L (ref 136–145)
WBC NRBC COR # BLD: 7.64 10*3/MM3 (ref 3.4–10.8)

## 2019-02-14 PROCEDURE — 80053 COMPREHEN METABOLIC PANEL: CPT | Performed by: OTOLARYNGOLOGY

## 2019-02-14 PROCEDURE — 85027 COMPLETE CBC AUTOMATED: CPT | Performed by: OTOLARYNGOLOGY

## 2019-02-14 PROCEDURE — 36415 COLL VENOUS BLD VENIPUNCTURE: CPT

## 2019-02-14 NOTE — DISCHARGE INSTRUCTIONS
Take the following medications the morning of surgery with a small sip of water:    TIME OF ARRIVAL WILL BE CALLED TO YOU THE DAY BEFORE SURGERY.    [facility]    General Instructions:  • Do not eat solid food after midnight the night before surgery.  • You may drink clear liquids day of surgery but must stop at least one hour before your hospital arrival time.  • It is beneficial for you to have a clear drink that contains carbohydrates the day of surgery.  We suggest a 12 to 20 ounce bottle of Gatorade or Powerade for non-diabetic patients or a 12 to 20 ounce bottle of G2 or Powerade Zero for diabetic patients. (Pediatric patients, are not advised to drink a 12 to 20 ounce carbohydrate drink)    Clear liquids are liquids you can see through.  Nothing red in color.     Plain water                               Sports drinks  Sodas                                   Gelatin (Jell-O)  Fruit juices without pulp such as white grape juice and apple juice  Popsicles that contain no fruit or yogurt  Tea or coffee (no cream or milk added)  Gatorade / Powerade  G2 / Powerade Zero    • Infants may have breast milk up to four hours before surgery.  • Infants drinking formula may drink formula up to six hours before surgery.   • Patients who avoid smoking, chewing tobacco and alcohol for 4 weeks prior to surgery have a reduced risk of post-operative complications.  Quit smoking as many days before surgery as you can.  • Do not smoke, use chewing tobacco or drink alcohol the day of surgery.   • If applicable bring your C-PAP/ BI-PAP machine.  • Bring any papers given to you in the doctor’s office.  • Wear clean comfortable clothes and socks.  • Do not wear contact lenses or make-up.  Bring a case for your glasses.   • Bring crutches or walker if applicable.  • Remove all piercings.  Leave jewelry and any other valuables at home.  • Hair extensions with metal clips must be removed prior to surgery.  • The Pre-Admission Testing nurse  will instruct you to bring medications if unable to obtain an accurate list in Pre-Admission Testing.        If you were given a blood bank ID arm band remember to bring it with you the day of surgery.    Preventing a Surgical Site Infection:  • For 2 to 3 days before surgery, avoid shaving with a razor because the razor can irritate skin and make it easier to develop an infection.    • Any areas of open skin can increase the risk of a post-operative wound infection by allowing bacteria to enter and travel throughout the body.  Notify your surgeon if you have any skin wounds / rashes even if it is not near the expected surgical site.  The area will need assessed to determine if surgery should be delayed until it is healed.  • The night prior to surgery sleep in a clean bed with clean clothing.  Do not allow pets to sleep with you.  • Shower on the morning of surgery using a fresh bar of anti-bacterial soap (such as Dial) and clean washcloth.  Dry with a clean towel and dress in clean clothing.  • Ask your surgeon if you will be receiving antibiotics prior to surgery.  • Make sure you, your family, and all healthcare providers clean their hands with soap and water or an alcohol based hand  before caring for you or your wound.    Day of surgery:  Upon arrival, a Pre-op nurse and Anesthesiologist will review your health history, obtain vital signs, and answer questions you may have.  The only belongings needed at this time will be your home medications and if applicable your C-PAP/BI-PAP machine.  If you are staying overnight your family can leave the rest of your belongings in the car and bring them to your room later.  A Pre-op nurse will start an IV and you may receive medication in preparation for surgery, including something to help you relax.  Your family will be able to see you in the Pre-op area.  While you are in surgery your family should notify the waiting room  if they leave the waiting  room area and provide a contact phone number.    Please be aware that surgery does come with discomfort.  We want to make every effort to control your discomfort so please discuss any uncontrolled symptoms with your nurse.   Your doctor will most likely have prescribed pain medications.      If you are going home after surgery you will receive individualized written care instructions before being discharged.  A responsible adult must drive you to and from the hospital on the day of your surgery and stay with you for 24 hours.    If you are staying overnight following surgery, you will be transported to your hospital room following the recovery period.  [facility] has all private rooms.    You have received a list of surgical assistants for your reference.  If you have any questions please call Pre-Admission Testing at [phone].  Deductibles and co-payments are collected on the day of service. Please be prepared to pay the required co-pay, deductible or deposit on the day of service as defined by your plan.

## 2019-02-20 ENCOUNTER — DOCUMENTATION (OUTPATIENT)
Dept: OTOLARYNGOLOGY | Facility: HOSPITAL | Age: 74
End: 2019-02-20

## 2019-02-20 ENCOUNTER — ANESTHESIA EVENT (OUTPATIENT)
Dept: PERIOP | Facility: HOSPITAL | Age: 74
End: 2019-02-20

## 2019-02-20 ENCOUNTER — ANESTHESIA (OUTPATIENT)
Dept: PERIOP | Facility: HOSPITAL | Age: 74
End: 2019-02-20

## 2019-02-20 ENCOUNTER — HOSPITAL ENCOUNTER (OUTPATIENT)
Facility: HOSPITAL | Age: 74
Setting detail: HOSPITAL OUTPATIENT SURGERY
Discharge: HOME | End: 2019-02-20
Attending: OTOLARYNGOLOGY | Admitting: OTOLARYNGOLOGY

## 2019-02-20 VITALS
DIASTOLIC BLOOD PRESSURE: 79 MMHG | SYSTOLIC BLOOD PRESSURE: 123 MMHG | TEMPERATURE: 98.2 F | HEART RATE: 62 BPM | OXYGEN SATURATION: 99 % | RESPIRATION RATE: 16 BRPM

## 2019-02-20 DIAGNOSIS — J34.89 NASAL OBSTRUCTION: ICD-10-CM

## 2019-02-20 DIAGNOSIS — J34.2 DEVIATED NASAL SEPTUM: ICD-10-CM

## 2019-02-20 PROCEDURE — 88300 SURGICAL PATH GROSS: CPT | Performed by: OTOLARYNGOLOGY

## 2019-02-20 PROCEDURE — 88304 TISSUE EXAM BY PATHOLOGIST: CPT | Performed by: OTOLARYNGOLOGY

## 2019-02-20 PROCEDURE — 25010000002 DEXAMETHASONE PER 1 MG: Performed by: NURSE ANESTHETIST, CERTIFIED REGISTERED

## 2019-02-20 PROCEDURE — 25010000002 FENTANYL CITRATE (PF) 100 MCG/2ML SOLUTION: Performed by: NURSE ANESTHETIST, CERTIFIED REGISTERED

## 2019-02-20 PROCEDURE — 25010000002 PROPOFOL 10 MG/ML EMULSION: Performed by: NURSE ANESTHETIST, CERTIFIED REGISTERED

## 2019-02-20 PROCEDURE — 25010000002 ONDANSETRON PER 1 MG: Performed by: NURSE ANESTHETIST, CERTIFIED REGISTERED

## 2019-02-20 RX ORDER — SODIUM CHLORIDE, SODIUM LACTATE, POTASSIUM CHLORIDE, CALCIUM CHLORIDE 600; 310; 30; 20 MG/100ML; MG/100ML; MG/100ML; MG/100ML
9 INJECTION, SOLUTION INTRAVENOUS CONTINUOUS
Status: DISCONTINUED | OUTPATIENT
Start: 2019-02-20 | End: 2019-02-20 | Stop reason: HOSPADM

## 2019-02-20 RX ORDER — MIDAZOLAM HYDROCHLORIDE 1 MG/ML
1 INJECTION INTRAMUSCULAR; INTRAVENOUS
Status: DISCONTINUED | OUTPATIENT
Start: 2019-02-20 | End: 2019-02-20 | Stop reason: HOSPADM

## 2019-02-20 RX ORDER — HYDROMORPHONE HYDROCHLORIDE 1 MG/ML
0.5 INJECTION, SOLUTION INTRAMUSCULAR; INTRAVENOUS; SUBCUTANEOUS
Status: DISCONTINUED | OUTPATIENT
Start: 2019-02-20 | End: 2019-02-20 | Stop reason: HOSPADM

## 2019-02-20 RX ORDER — ACETAMINOPHEN 500 MG
500 TABLET ORAL ONCE
Status: COMPLETED | OUTPATIENT
Start: 2019-02-20 | End: 2019-02-20

## 2019-02-20 RX ORDER — PROMETHAZINE HYDROCHLORIDE 25 MG/ML
6.25 INJECTION, SOLUTION INTRAMUSCULAR; INTRAVENOUS ONCE AS NEEDED
Status: DISCONTINUED | OUTPATIENT
Start: 2019-02-20 | End: 2019-02-20 | Stop reason: HOSPADM

## 2019-02-20 RX ORDER — LIDOCAINE HYDROCHLORIDE AND EPINEPHRINE 10; 10 MG/ML; UG/ML
INJECTION, SOLUTION INFILTRATION; PERINEURAL AS NEEDED
Status: DISCONTINUED | OUTPATIENT
Start: 2019-02-20 | End: 2019-02-20 | Stop reason: HOSPADM

## 2019-02-20 RX ORDER — OXYMETAZOLINE HYDROCHLORIDE 0.05 G/100ML
3 SPRAY NASAL ONCE
Status: COMPLETED | OUTPATIENT
Start: 2019-02-20 | End: 2019-02-20

## 2019-02-20 RX ORDER — SODIUM CHLORIDE 9 MG/ML
INJECTION, SOLUTION INTRAVENOUS AS NEEDED
Status: DISCONTINUED | OUTPATIENT
Start: 2019-02-20 | End: 2019-02-20 | Stop reason: HOSPADM

## 2019-02-20 RX ORDER — FENTANYL CITRATE 50 UG/ML
INJECTION, SOLUTION INTRAMUSCULAR; INTRAVENOUS AS NEEDED
Status: DISCONTINUED | OUTPATIENT
Start: 2019-02-20 | End: 2019-02-20 | Stop reason: SURG

## 2019-02-20 RX ORDER — NALBUPHINE HCL 10 MG/ML
2 AMPUL (ML) INJECTION EVERY 4 HOURS PRN
Status: DISCONTINUED | OUTPATIENT
Start: 2019-02-20 | End: 2019-02-20 | Stop reason: HOSPADM

## 2019-02-20 RX ORDER — PROMETHAZINE HYDROCHLORIDE 25 MG/1
25 TABLET ORAL ONCE AS NEEDED
Status: DISCONTINUED | OUTPATIENT
Start: 2019-02-20 | End: 2019-02-20 | Stop reason: HOSPADM

## 2019-02-20 RX ORDER — FENTANYL CITRATE 50 UG/ML
50 INJECTION, SOLUTION INTRAMUSCULAR; INTRAVENOUS
Status: DISCONTINUED | OUTPATIENT
Start: 2019-02-20 | End: 2019-02-20 | Stop reason: HOSPADM

## 2019-02-20 RX ORDER — HYDRALAZINE HYDROCHLORIDE 20 MG/ML
5 INJECTION INTRAMUSCULAR; INTRAVENOUS
Status: DISCONTINUED | OUTPATIENT
Start: 2019-02-20 | End: 2019-02-20 | Stop reason: HOSPADM

## 2019-02-20 RX ORDER — OXYMETAZOLINE HYDROCHLORIDE 0.05 G/100ML
SPRAY NASAL
Status: COMPLETED
Start: 2019-02-20 | End: 2019-02-20

## 2019-02-20 RX ORDER — EPHEDRINE SULFATE 50 MG/ML
INJECTION, SOLUTION INTRAVENOUS AS NEEDED
Status: DISCONTINUED | OUTPATIENT
Start: 2019-02-20 | End: 2019-02-20 | Stop reason: SURG

## 2019-02-20 RX ORDER — ACETAMINOPHEN 325 MG/1
650 TABLET ORAL ONCE AS NEEDED
Status: DISCONTINUED | OUTPATIENT
Start: 2019-02-20 | End: 2019-02-20 | Stop reason: HOSPADM

## 2019-02-20 RX ORDER — LIDOCAINE HYDROCHLORIDE 10 MG/ML
0.5 INJECTION, SOLUTION EPIDURAL; INFILTRATION; INTRACAUDAL; PERINEURAL ONCE AS NEEDED
Status: DISCONTINUED | OUTPATIENT
Start: 2019-02-20 | End: 2019-02-20 | Stop reason: HOSPADM

## 2019-02-20 RX ORDER — HYDROCODONE BITARTRATE AND ACETAMINOPHEN 5; 325 MG/1; MG/1
1 TABLET ORAL ONCE AS NEEDED
Status: COMPLETED | OUTPATIENT
Start: 2019-02-20 | End: 2019-02-20

## 2019-02-20 RX ORDER — SODIUM CHLORIDE 0.9 % (FLUSH) 0.9 %
3 SYRINGE (ML) INJECTION EVERY 12 HOURS SCHEDULED
Status: DISCONTINUED | OUTPATIENT
Start: 2019-02-20 | End: 2019-02-20 | Stop reason: HOSPADM

## 2019-02-20 RX ORDER — ROCURONIUM BROMIDE 10 MG/ML
INJECTION, SOLUTION INTRAVENOUS AS NEEDED
Status: DISCONTINUED | OUTPATIENT
Start: 2019-02-20 | End: 2019-02-20 | Stop reason: SURG

## 2019-02-20 RX ORDER — ACETAMINOPHEN 650 MG/1
650 SUPPOSITORY RECTAL ONCE AS NEEDED
Status: DISCONTINUED | OUTPATIENT
Start: 2019-02-20 | End: 2019-02-20 | Stop reason: HOSPADM

## 2019-02-20 RX ORDER — MIDAZOLAM HYDROCHLORIDE 1 MG/ML
2 INJECTION INTRAMUSCULAR; INTRAVENOUS
Status: DISCONTINUED | OUTPATIENT
Start: 2019-02-20 | End: 2019-02-20 | Stop reason: HOSPADM

## 2019-02-20 RX ORDER — PROMETHAZINE HYDROCHLORIDE 25 MG/1
25 SUPPOSITORY RECTAL ONCE AS NEEDED
Status: DISCONTINUED | OUTPATIENT
Start: 2019-02-20 | End: 2019-02-20 | Stop reason: HOSPADM

## 2019-02-20 RX ORDER — ONDANSETRON 2 MG/ML
INJECTION INTRAMUSCULAR; INTRAVENOUS AS NEEDED
Status: DISCONTINUED | OUTPATIENT
Start: 2019-02-20 | End: 2019-02-20 | Stop reason: SURG

## 2019-02-20 RX ORDER — SODIUM CHLORIDE 0.9 % (FLUSH) 0.9 %
1-10 SYRINGE (ML) INJECTION AS NEEDED
Status: DISCONTINUED | OUTPATIENT
Start: 2019-02-20 | End: 2019-02-20 | Stop reason: HOSPADM

## 2019-02-20 RX ORDER — PROPOFOL 10 MG/ML
VIAL (ML) INTRAVENOUS AS NEEDED
Status: DISCONTINUED | OUTPATIENT
Start: 2019-02-20 | End: 2019-02-20 | Stop reason: SURG

## 2019-02-20 RX ORDER — DIPHENHYDRAMINE HYDROCHLORIDE 50 MG/ML
12.5 INJECTION INTRAMUSCULAR; INTRAVENOUS
Status: DISCONTINUED | OUTPATIENT
Start: 2019-02-20 | End: 2019-02-20 | Stop reason: HOSPADM

## 2019-02-20 RX ORDER — DEXAMETHASONE SODIUM PHOSPHATE 10 MG/ML
INJECTION INTRAMUSCULAR; INTRAVENOUS AS NEEDED
Status: DISCONTINUED | OUTPATIENT
Start: 2019-02-20 | End: 2019-02-20 | Stop reason: SURG

## 2019-02-20 RX ORDER — MAGNESIUM HYDROXIDE 1200 MG/15ML
LIQUID ORAL AS NEEDED
Status: DISCONTINUED | OUTPATIENT
Start: 2019-02-20 | End: 2019-02-20 | Stop reason: HOSPADM

## 2019-02-20 RX ORDER — LIDOCAINE HYDROCHLORIDE 20 MG/ML
INJECTION, SOLUTION INFILTRATION; PERINEURAL AS NEEDED
Status: DISCONTINUED | OUTPATIENT
Start: 2019-02-20 | End: 2019-02-20 | Stop reason: SURG

## 2019-02-20 RX ORDER — NALBUPHINE HCL 10 MG/ML
10 AMPUL (ML) INJECTION EVERY 4 HOURS PRN
Status: DISCONTINUED | OUTPATIENT
Start: 2019-02-20 | End: 2019-02-20 | Stop reason: HOSPADM

## 2019-02-20 RX ORDER — NALOXONE HCL 0.4 MG/ML
0.4 VIAL (ML) INJECTION AS NEEDED
Status: DISCONTINUED | OUTPATIENT
Start: 2019-02-20 | End: 2019-02-20 | Stop reason: HOSPADM

## 2019-02-20 RX ADMIN — OXYMETAZOLINE HYDROCHLORIDE 3 SPRAY: 5 SPRAY NASAL at 08:41

## 2019-02-20 RX ADMIN — SODIUM CHLORIDE, POTASSIUM CHLORIDE, SODIUM LACTATE AND CALCIUM CHLORIDE 9 ML/HR: 600; 310; 30; 20 INJECTION, SOLUTION INTRAVENOUS at 10:21

## 2019-02-20 RX ADMIN — SODIUM CHLORIDE, POTASSIUM CHLORIDE, SODIUM LACTATE AND CALCIUM CHLORIDE 9 ML/HR: 600; 310; 30; 20 INJECTION, SOLUTION INTRAVENOUS at 08:33

## 2019-02-20 RX ADMIN — ACETAMINOPHEN 500 MG: 500 TABLET, FILM COATED ORAL at 08:33

## 2019-02-20 RX ADMIN — DEXAMETHASONE SODIUM PHOSPHATE 10 MG: 10 INJECTION INTRAMUSCULAR; INTRAVENOUS at 09:27

## 2019-02-20 RX ADMIN — ONDANSETRON 4 MG: 2 INJECTION INTRAMUSCULAR; INTRAVENOUS at 09:50

## 2019-02-20 RX ADMIN — SUGAMMADEX 200 MG: 100 INJECTION, SOLUTION INTRAVENOUS at 09:58

## 2019-02-20 RX ADMIN — PROPOFOL 200 MG: 10 INJECTION, EMULSION INTRAVENOUS at 08:51

## 2019-02-20 RX ADMIN — FENTANYL CITRATE 100 MCG: 50 INJECTION INTRAMUSCULAR; INTRAVENOUS at 08:45

## 2019-02-20 RX ADMIN — PROPOFOL 40 MG: 10 INJECTION, EMULSION INTRAVENOUS at 09:02

## 2019-02-20 RX ADMIN — ROCURONIUM BROMIDE 50 MG: 10 INJECTION, SOLUTION INTRAVENOUS at 08:51

## 2019-02-20 RX ADMIN — EPHEDRINE SULFATE 5 MG: 50 INJECTION INTRAMUSCULAR; INTRAVENOUS; SUBCUTANEOUS at 09:18

## 2019-02-20 RX ADMIN — EPHEDRINE SULFATE 5 MG: 50 INJECTION INTRAMUSCULAR; INTRAVENOUS; SUBCUTANEOUS at 09:26

## 2019-02-20 RX ADMIN — LIDOCAINE HYDROCHLORIDE 100 MG: 20 INJECTION, SOLUTION INFILTRATION; PERINEURAL at 08:51

## 2019-02-20 RX ADMIN — HYDROCODONE BITARTRATE AND ACETAMINOPHEN 1 TABLET: 5; 325 TABLET ORAL at 10:36

## 2019-02-20 RX ADMIN — OXYMETAZOLINE HYDROCHLORIDE 3 SPRAY: 0.05 SPRAY NASAL at 08:41

## 2019-02-20 NOTE — ANESTHESIA POSTPROCEDURE EVALUATION
Patient: Jhonatan Norman    Procedure Summary     Date:  02/20/19 Room / Location:   [facility]    Anesthesia Start:  0844 Anesthesia Stop:  1006    Procedures:       NASAL SEPTOPLASTY BILATERAL INFERIOR TURBINECTOMY (Bilateral Nose)      ENDOSCOPIC FUNCTIONAL SINUS SURGERY (N/A Nose) Diagnosis:      Surgeon:  Kareem Weaver MD Provider:  Elieser Claire MD    Anesthesia Type:  general ASA Status:  3          Anesthesia Type: general  Last vitals  BP   123/79 (02/20/19 1127)   Temp   36.8 °C (98.2 °F)(in time 1006) (02/20/19 1010)   Pulse   62 (02/20/19 1127)   Resp   16 (02/20/19 1127)     SpO2   99 % (02/20/19 1127)     Post Anesthesia Care and Evaluation    Patient location during evaluation: bedside  Patient participation: complete - patient participated  Level of consciousness: awake and alert  Pain management: adequate  Airway patency: patent  Anesthetic complications: No anesthetic complications    Cardiovascular status: acceptable  Respiratory status: acceptable  Hydration status: acceptable    Comments: /79   Pulse 62   Temp 36.8 °C (98.2 °F) (Temporal) Comment: in time 1006  Resp 16   SpO2 99%

## 2019-02-20 NOTE — ANESTHESIA PROCEDURE NOTES
Airway  Urgency: elective    Airway not difficult    General Information and Staff    Patient location during procedure: OR  Anesthesiologist: Elieser Claire MD  CRNA: Ana Boss CRNA    Indications and Patient Condition  Indications for airway management: airway protection    Preoxygenated: yes  Mask difficulty assessment: 1 - vent by mask    Final Airway Details  Final airway type: endotracheal airway      Successful airway: ETT and SIDNEY tube  Cuffed: yes   Successful intubation technique: direct laryngoscopy  Facilitating devices/methods: anterior pressure/BURP  Endotracheal tube insertion site: oral  Blade: Alvarado  Blade size: 2  ETT size (mm): 8.0  Cormack-Lehane Classification: grade IIa - partial view of glottis  Placement verified by: chest auscultation and capnometry   Cuff volume (mL): 8  Measured from: lips  ETT to lips (cm): 22  Number of attempts at approach: 2    Additional Comments  SIVI.  EYES TAPED CLOSED PRIOR TO DL.  INTUBATION  WITH 7.5ETT, LEAK NOTED, ETT REMOVED. 8.0 PLACED WITH EASE. APPEARS ATRAUMATIC.  NO CHANGE TO DENTITION. +ETCO2. +BBS. +CR.

## 2019-02-20 NOTE — ANESTHESIA PREPROCEDURE EVALUATION
Anesthesia Evaluation     no history of anesthetic complications:  NPO Solid Status: > 8 hours             Airway   Mallampati: II  TM distance: >3 FB  Neck ROM: full  No difficulty expected  Dental - normal exam     Pulmonary - negative pulmonary ROS and normal exam   Cardiovascular - negative cardio ROS    Rhythm: regular    (-) murmur      Neuro/Psych- negative ROS  GI/Hepatic/Renal/Endo    (+)  GERD,      Musculoskeletal     Abdominal  - normal exam   Substance History      OB/GYN negative ob/gyn ROS         Other   (+) arthritis                     Anesthesia Plan    ASA 3     general     intravenous induction

## 2019-02-21 LAB
CYTO UR: NORMAL
LAB AP CASE REPORT: NORMAL
PATH REPORT.FINAL DX SPEC: NORMAL
PATH REPORT.GROSS SPEC: NORMAL

## 2019-04-02 NOTE — PROGRESS NOTES
Subjective   Patient ID: Jhonatan Norman is a 73 y.o. male is here today for  follow-up.      Patient was last seen 8.6.18 for low back and right leg pain.  He was prescribed MDP, but he did not take them.  He states that his leg pain resolved on it's own.    Patient is currently having bilateral leg weakness.  Patient is having intermittent mild low back pain and N/T bilateral legs and foot, L > R and bilateral posterior/anterior leg pain.  He states that his symptoms have improved slightly over the past week on it's own.  He is not taking anything for pain.        I am seeing this patient intermittently for the last several years.  He has known spinal stenosis and a previous history of lumbar surgery.  He is a chronically weak left gastrocnemius muscle.  But he is functional.  He hunts any goes to the gym.  Occasionally has flareups of pain but worse continue to watch him and I will see him in a year.  If we ever need to reoperate on him he would probably need to be decompressed and fused from L4-S1.    Back Pain   The problem occurs intermittently. The pain is present in the lumbar spine. The pain radiates to the left thigh and right thigh. The pain is at a severity of 2/10. The pain is mild. The symptoms are aggravated by sitting and standing. Associated symptoms include leg pain, numbness, tingling and weakness.   Leg Pain    The pain is present in the left leg and right leg. The pain is moderate. Associated symptoms include numbness and tingling.   Extremity Weakness    The pain is present in the left lower leg, left upper leg, right lower leg and right upper leg. The problem occurs constantly. The pain is at a severity of 7/10. Associated symptoms include numbness and tingling.       The following portions of the patient's history were reviewed and updated as appropriate: allergies, current medications, past family history, past medical history, past social history, past surgical history and problem  list.    Review of Systems   Musculoskeletal: Positive for arthralgias, back pain and extremity weakness. Negative for gait problem.   Neurological: Positive for tingling, weakness and numbness.   All other systems reviewed and are negative.      Objective   Physical Exam   Constitutional: He is oriented to person, place, and time. He appears well-developed and well-nourished.   HENT:   Head: Normocephalic and atraumatic.   Eyes: Conjunctivae and EOM are normal. Pupils are equal, round, and reactive to light.   Fundoscopic exam:       The right eye shows no papilledema. The right eye shows venous pulsations.        The left eye shows no papilledema. The left eye shows venous pulsations.   Neck: Carotid bruit is not present.   Neurological: He is oriented to person, place, and time. He has a normal Finger-Nose-Finger Test and a normal Heel to Shin Test. Gait normal.   Reflex Scores:       Tricep reflexes are 2+ on the right side and 2+ on the left side.       Bicep reflexes are 2+ on the right side and 2+ on the left side.       Brachioradialis reflexes are 2+ on the right side and 2+ on the left side.       Patellar reflexes are 2+ on the right side and 2+ on the left side.       Achilles reflexes are 2+ on the right side and 2+ on the left side.  Psychiatric: His speech is normal.     Neurologic Exam     Mental Status   Oriented to person, place, and time.   Registration of memory: Good recent and remote memory.   Attention: normal. Concentration: normal.   Speech: speech is normal   Level of consciousness: alert  Knowledge: consistent with education.     Cranial Nerves     CN II   Visual fields full to confrontation.   Visual acuity: normal    CN III, IV, VI   Pupils are equal, round, and reactive to light.  Extraocular motions are normal.     CN V   Facial sensation intact.   Right corneal reflex: normal  Left corneal reflex: normal    CN VII   Facial expression full, symmetric.   Right facial weakness:  none  Left facial weakness: none    CN VIII   Hearing: intact    CN IX, X   Palate: symmetric    CN XI   Right sternocleidomastoid strength: normal  Left sternocleidomastoid strength: normal    CN XII   Tongue: not atrophic  Tongue deviation: none    Motor Exam   Muscle bulk: normal  Right arm tone: normal  Left arm tone: normal  Right leg tone: normal  Left leg tone: normal    Strength   Strength 5/5 except as noted.   Left gastroc: 2/5    Sensory Exam   Light touch normal.     Gait, Coordination, and Reflexes     Gait  Gait: normal    Coordination   Finger to nose coordination: normal  Heel to shin coordination: normal    Reflexes   Right brachioradialis: 2+  Left brachioradialis: 2+  Right biceps: 2+  Left biceps: 2+  Right triceps: 2+  Left triceps: 2+  Right patellar: 2+  Left patellar: 2+  Right achilles: 2+  Left achilles: 2+  Right : 2+  Left : 2+      Assessment/Plan   Independent Review of Radiographic Studies:      Medical Decision Making:    Clinically stable.  He will continue his exercise program and I will see him in 1 year.  If there is a downturn, he will let me know.      Jhonatan was seen today for extremity weakness, back pain, leg pain and numbness.    Diagnoses and all orders for this visit:    Degeneration of intervertebral disc of lumbosacral region    Spinal stenosis of lumbar region with neurogenic claudication      Return in about 1 year (around 4/8/2020).

## 2019-04-08 ENCOUNTER — OFFICE VISIT (OUTPATIENT)
Dept: NEUROSURGERY | Facility: CLINIC | Age: 74
End: 2019-04-08

## 2019-04-08 VITALS
WEIGHT: 191 LBS | HEART RATE: 68 BPM | HEIGHT: 75 IN | BODY MASS INDEX: 23.75 KG/M2 | DIASTOLIC BLOOD PRESSURE: 70 MMHG | SYSTOLIC BLOOD PRESSURE: 110 MMHG

## 2019-04-08 DIAGNOSIS — M48.062 SPINAL STENOSIS OF LUMBAR REGION WITH NEUROGENIC CLAUDICATION: ICD-10-CM

## 2019-04-08 DIAGNOSIS — M51.37 DEGENERATION OF INTERVERTEBRAL DISC OF LUMBOSACRAL REGION: Primary | ICD-10-CM

## 2019-04-08 PROCEDURE — 99212 OFFICE O/P EST SF 10 MIN: CPT | Performed by: NEUROLOGICAL SURGERY

## 2019-06-05 ENCOUNTER — OFFICE VISIT (OUTPATIENT)
Dept: NEUROSURGERY | Facility: CLINIC | Age: 74
End: 2019-06-05

## 2019-06-05 VITALS
HEART RATE: 55 BPM | SYSTOLIC BLOOD PRESSURE: 112 MMHG | BODY MASS INDEX: 23.69 KG/M2 | DIASTOLIC BLOOD PRESSURE: 71 MMHG | HEIGHT: 75 IN | WEIGHT: 190.5 LBS

## 2019-06-05 DIAGNOSIS — M54.12 CERVICAL RADICULOPATHY: ICD-10-CM

## 2019-06-05 DIAGNOSIS — M54.6 PAIN IN THORACIC SPINE: ICD-10-CM

## 2019-06-05 DIAGNOSIS — M48.062 SPINAL STENOSIS OF LUMBAR REGION WITH NEUROGENIC CLAUDICATION: Primary | ICD-10-CM

## 2019-06-05 DIAGNOSIS — M51.37 DEGENERATION OF INTERVERTEBRAL DISC OF LUMBOSACRAL REGION: ICD-10-CM

## 2019-06-05 PROCEDURE — 99214 OFFICE O/P EST MOD 30 MIN: CPT | Performed by: PHYSICIAN ASSISTANT

## 2019-06-18 ENCOUNTER — HOSPITAL ENCOUNTER (OUTPATIENT)
Dept: MRI IMAGING | Facility: HOSPITAL | Age: 74
Discharge: HOME | End: 2019-06-18
Admitting: PHYSICIAN ASSISTANT

## 2019-06-18 ENCOUNTER — HOSPITAL ENCOUNTER (OUTPATIENT)
Dept: MRI IMAGING | Facility: HOSPITAL | Age: 74
Discharge: HOME | End: 2019-06-18

## 2019-06-18 DIAGNOSIS — M54.12 CERVICAL RADICULOPATHY: ICD-10-CM

## 2019-06-18 DIAGNOSIS — M54.6 PAIN IN THORACIC SPINE: ICD-10-CM

## 2019-06-18 PROCEDURE — 72141 MRI NECK SPINE W/O DYE: CPT

## 2019-06-18 PROCEDURE — 72146 MRI CHEST SPINE W/O DYE: CPT

## 2019-07-19 ENCOUNTER — OFFICE VISIT (OUTPATIENT)
Dept: NEUROSURGERY | Facility: CLINIC | Age: 74
End: 2019-07-19

## 2019-07-19 VITALS
HEIGHT: 72 IN | DIASTOLIC BLOOD PRESSURE: 64 MMHG | WEIGHT: 190 LBS | BODY MASS INDEX: 25.73 KG/M2 | SYSTOLIC BLOOD PRESSURE: 100 MMHG | HEART RATE: 61 BPM

## 2019-07-19 DIAGNOSIS — M54.12 CERVICAL RADICULOPATHY: Primary | ICD-10-CM

## 2019-07-19 DIAGNOSIS — M51.34 DDD (DEGENERATIVE DISC DISEASE), THORACIC: ICD-10-CM

## 2019-07-19 DIAGNOSIS — M48.062 SPINAL STENOSIS OF LUMBAR REGION WITH NEUROGENIC CLAUDICATION: ICD-10-CM

## 2019-07-19 PROCEDURE — 99214 OFFICE O/P EST MOD 30 MIN: CPT | Performed by: PHYSICIAN ASSISTANT

## 2019-10-22 ENCOUNTER — TELEPHONE (OUTPATIENT)
Dept: NEUROSURGERY | Facility: CLINIC | Age: 74
End: 2019-10-22

## 2019-10-22 NOTE — TELEPHONE ENCOUNTER
Pt says he is having trouble with his neck. He says there is a burning pain on the right side of his neck that goes down his back. Pt says then there is pain that goes down the left side of his neck that goes down the back of his left shoulder and to his arm.    Pt says he is having trouble swallowing. Some of the time his throat feels like it is closing up.   Pt is scheduled to come in on 10/25    Does pt need any imaging before his appt this week

## 2019-10-24 NOTE — TELEPHONE ENCOUNTER
Spoke to pt he is not having trouble breathing. He complains of R neck and arm pain and feeling of food slowly going down when swallowing. He is not sure if its a cervical issue or another issue his sister had to have her esophagus stretched before.       He will discuss at follow up appointment tomorrow.

## 2019-10-24 NOTE — PROGRESS NOTES
Subjective   Patient ID: Jhonatan Norman is a 73 y.o. male is here today for follow-up. He was last seen 7/19/2019 for upper back, neck and bilateral upper shoulder pain L>R.  Today he is being seen for neck, upper back and bilateral arm pain L>R. He also has difficulty swallowing. He routinely works out at the gym.    Neck Pain    This is a chronic problem. The current episode started more than 1 year ago. The problem occurs intermittently. The pain is associated with nothing. The pain is present in the right side and left side. The quality of the pain is described as aching, burning, shooting and stabbing. The pain is at a severity of 7/10. The pain is moderate. The pain is same all the time. Associated symptoms include numbness, tingling and trouble swallowing. Pertinent negatives include no chest pain. He has tried home exercises for the symptoms.   Back Pain   This is a chronic problem. The current episode started more than 1 year ago. The problem occurs intermittently. The pain is present in the thoracic spine. The quality of the pain is described as aching and burning. The pain is at a severity of 6/10. The pain is mild. The pain is the same all the time. The symptoms are aggravated by twisting. Stiffness is present all day. Associated symptoms include numbness and tingling. Pertinent negatives include no chest pain. He has tried heat, ice and home exercises for the symptoms.       The following portions of the patient's history were reviewed and updated as appropriate: allergies, current medications, past family history, past medical history, past social history, past surgical history and problem list.    Review of Systems   HENT: Positive for trouble swallowing.    Respiratory: Negative for chest tightness and shortness of breath.    Cardiovascular: Negative for chest pain.   Genitourinary: Negative for difficulty urinating.   Musculoskeletal: Positive for back pain and neck pain.   Neurological: Positive  for tingling and numbness.   All other systems reviewed and are negative.      Objective   Physical Exam   Constitutional: He is oriented to person, place, and time. He appears well-developed and well-nourished.   HENT:   Head: Normocephalic and atraumatic.   Right Ear: External ear normal.   Left Ear: External ear normal.   Eyes: Conjunctivae and EOM are normal. Pupils are equal, round, and reactive to light. Right eye exhibits no discharge. Left eye exhibits no discharge.   Neck: Normal range of motion. Neck supple. No tracheal deviation present.   Pulmonary/Chest: Effort normal. No stridor. No respiratory distress.   Musculoskeletal: Normal range of motion. He exhibits no edema, tenderness or deformity.   Neurological: He is alert and oriented to person, place, and time. He has normal strength and normal reflexes. He displays no atrophy, no tremor and normal reflexes. No cranial nerve deficit or sensory deficit. He exhibits normal muscle tone. He displays a negative Romberg sign. He displays no seizure activity. Coordination and gait normal.   No long tract signs   Skin: Skin is warm and dry.   Psychiatric: He has a normal mood and affect. His behavior is normal. Judgment and thought content normal.   Nursing note and vitals reviewed.    Neurologic Exam     Mental Status   Oriented to person, place, and time.     Cranial Nerves     CN III, IV, VI   Pupils are equal, round, and reactive to light.  Extraocular motions are normal.     Motor Exam     Strength   Strength 5/5 throughout.       Assessment/Plan   Independent Review of Radiographic Studies:    Medical Decision Making:    Mr. Norman had previous lumbar surgery many years ago.  He has been followed by Dr. Khalil for recurrent stenosis at L4-L5 L5-S1.  He was just here 3 months ago and his symptoms continue to be manageable.  The back and leg pain is tolerable.  His left gastrocnemius weakness has been a chronic issue and is unchanged.  He came back 4wks  ago because of increasing neck pain and upper thoracic pain as well as interscapular pain that radiates to both shoulders.  It started 3 to 4 years ago after he was running under his garage door and was ultimately hit by the door and fell.  The symptoms had thankfully improved by the time he was seen after starting CBD oil.  No real arm pain or numbness or tingling or weakness or changes in gait or balance or incontinence. The cervical spine MRI showed multilevel degenerative disc disease with a degenerative retrolisthesis at C4-C5 with moderate spinal stenosis and more moderate to severe right foraminal narrowing.  There was disc osteophyte complexes at both C5-C6 and C6-C7 with mild central stenosis and mild to moderate foraminal narrowing.  Thoracic MRI showed mild degenerative change and mild disc bulging but no significant central stenosis or foraminal narrowing.    He called recently because of increased neck pain and interscapular pain but again the symptoms have since improved.  At this time his lumbar radiculopathy is also well controlled.  He understands that therapy or epidurals for either the cervical or lumbar spine could be considered at any point and will call if interested but at this point he is very clear that he is not interested in additional treatment.       Jhonatan was seen today for neck pain and back pain.    Diagnoses and all orders for this visit:    Degeneration of intervertebral disc of lumbosacral region    Spinal stenosis of lumbar region with neurogenic claudication    Cervical radiculopathy      Return if symptoms worsen or fail to improve.

## 2019-10-25 ENCOUNTER — OFFICE VISIT (OUTPATIENT)
Dept: NEUROSURGERY | Facility: CLINIC | Age: 74
End: 2019-10-25

## 2019-10-25 VITALS — DIASTOLIC BLOOD PRESSURE: 62 MMHG | SYSTOLIC BLOOD PRESSURE: 116 MMHG | HEART RATE: 57 BPM

## 2019-10-25 DIAGNOSIS — M54.12 CERVICAL RADICULOPATHY: ICD-10-CM

## 2019-10-25 DIAGNOSIS — M48.062 SPINAL STENOSIS OF LUMBAR REGION WITH NEUROGENIC CLAUDICATION: ICD-10-CM

## 2019-10-25 DIAGNOSIS — M51.37 DEGENERATION OF INTERVERTEBRAL DISC OF LUMBOSACRAL REGION: Primary | ICD-10-CM

## 2019-10-25 PROCEDURE — 99213 OFFICE O/P EST LOW 20 MIN: CPT | Performed by: PHYSICIAN ASSISTANT

## 2019-10-25 RX ORDER — RANITIDINE HCL 75 MG
75 TABLET ORAL 2 TIMES DAILY
COMMUNITY
End: 2021-02-15

## 2020-07-06 ENCOUNTER — OFFICE VISIT (OUTPATIENT)
Dept: NEUROSURGERY | Facility: CLINIC | Age: 75
End: 2020-07-06

## 2020-07-06 DIAGNOSIS — M48.062 SPINAL STENOSIS OF LUMBAR REGION WITH NEUROGENIC CLAUDICATION: Primary | ICD-10-CM

## 2020-07-06 DIAGNOSIS — M62.81 CALF MUSCLE WEAKNESS: ICD-10-CM

## 2020-07-06 DIAGNOSIS — M51.34 DDD (DEGENERATIVE DISC DISEASE), THORACIC: ICD-10-CM

## 2020-07-06 PROCEDURE — 99213 OFFICE O/P EST LOW 20 MIN: CPT | Performed by: NEUROLOGICAL SURGERY

## 2020-07-22 ENCOUNTER — HOSPITAL ENCOUNTER (OUTPATIENT)
Dept: MRI IMAGING | Facility: HOSPITAL | Age: 75
Discharge: HOME | End: 2020-07-22
Admitting: NEUROLOGICAL SURGERY

## 2020-07-22 ENCOUNTER — HOSPITAL ENCOUNTER (OUTPATIENT)
Dept: GENERAL RADIOLOGY | Facility: HOSPITAL | Age: 75
Discharge: HOME | End: 2020-07-22

## 2020-07-22 DIAGNOSIS — M48.062 SPINAL STENOSIS OF LUMBAR REGION WITH NEUROGENIC CLAUDICATION: ICD-10-CM

## 2020-07-22 DIAGNOSIS — M62.81 CALF MUSCLE WEAKNESS: ICD-10-CM

## 2020-07-22 DIAGNOSIS — M51.34 DDD (DEGENERATIVE DISC DISEASE), THORACIC: ICD-10-CM

## 2020-07-22 PROCEDURE — 72158 MRI LUMBAR SPINE W/O & W/DYE: CPT

## 2020-07-22 PROCEDURE — 82565 ASSAY OF CREATININE: CPT

## 2020-07-22 PROCEDURE — A9575 INJ GADOTERATE MEGLUMI 0.1ML: HCPCS | Performed by: NEUROLOGICAL SURGERY

## 2020-07-22 PROCEDURE — 25010000002 GADOTERATE MEGLUMINE 10 MMOL/20ML SOLUTION: Performed by: NEUROLOGICAL SURGERY

## 2020-07-22 PROCEDURE — 72114 X-RAY EXAM L-S SPINE BENDING: CPT

## 2020-07-22 RX ORDER — GADOTERATE MEGLUMINE 376.9 MG/ML
20 INJECTION INTRAVENOUS
Status: COMPLETED | OUTPATIENT
Start: 2020-07-22 | End: 2020-07-22

## 2020-07-22 RX ADMIN — GADOTERATE MEGLUMINE 20 ML: 376.9 INJECTION, SOLUTION INTRAVENOUS at 17:18

## 2020-07-23 LAB — CREAT BLDA-MCNC: 1 MG/DL (ref 0.6–1.3)

## 2020-08-05 ENCOUNTER — OFFICE VISIT (OUTPATIENT)
Dept: NEUROSURGERY | Facility: CLINIC | Age: 75
End: 2020-08-05

## 2020-08-05 VITALS
BODY MASS INDEX: 25.46 KG/M2 | TEMPERATURE: 98.6 F | HEART RATE: 74 BPM | DIASTOLIC BLOOD PRESSURE: 74 MMHG | SYSTOLIC BLOOD PRESSURE: 123 MMHG | HEIGHT: 72 IN

## 2020-08-05 DIAGNOSIS — M43.16 SPONDYLOLISTHESIS AT L4-L5 LEVEL: Primary | ICD-10-CM

## 2020-08-05 DIAGNOSIS — M62.81 CALF MUSCLE WEAKNESS: ICD-10-CM

## 2020-08-05 DIAGNOSIS — M48.062 SPINAL STENOSIS OF LUMBAR REGION WITH NEUROGENIC CLAUDICATION: ICD-10-CM

## 2020-08-05 PROCEDURE — 99214 OFFICE O/P EST MOD 30 MIN: CPT | Performed by: NEUROLOGICAL SURGERY

## 2020-08-12 ENCOUNTER — ANESTHESIA (OUTPATIENT)
Dept: PAIN MEDICINE | Facility: HOSPITAL | Age: 75
End: 2020-08-12

## 2020-08-12 ENCOUNTER — HOSPITAL ENCOUNTER (OUTPATIENT)
Dept: GENERAL RADIOLOGY | Facility: HOSPITAL | Age: 75
Discharge: HOME | End: 2020-08-12

## 2020-08-12 ENCOUNTER — HOSPITAL ENCOUNTER (OUTPATIENT)
Dept: PAIN MEDICINE | Facility: HOSPITAL | Age: 75
Discharge: HOME | End: 2020-08-12
Admitting: ANESTHESIOLOGY

## 2020-08-12 ENCOUNTER — ANESTHESIA EVENT (OUTPATIENT)
Dept: PAIN MEDICINE | Facility: HOSPITAL | Age: 75
End: 2020-08-12

## 2020-08-12 VITALS
WEIGHT: 187 LBS | SYSTOLIC BLOOD PRESSURE: 105 MMHG | HEART RATE: 56 BPM | HEIGHT: 75 IN | RESPIRATION RATE: 16 BRPM | TEMPERATURE: 97.5 F | OXYGEN SATURATION: 95 % | DIASTOLIC BLOOD PRESSURE: 68 MMHG | BODY MASS INDEX: 23.25 KG/M2

## 2020-08-12 DIAGNOSIS — R52 PAIN: ICD-10-CM

## 2020-08-12 DIAGNOSIS — M48.062 SPINAL STENOSIS OF LUMBAR REGION WITH NEUROGENIC CLAUDICATION: Primary | ICD-10-CM

## 2020-08-12 PROCEDURE — 77003 FLUOROGUIDE FOR SPINE INJECT: CPT

## 2020-08-12 PROCEDURE — C1755 CATHETER, INTRASPINAL: HCPCS

## 2020-08-12 PROCEDURE — 25010000002 METHYLPREDNISOLONE PER 80 MG: Performed by: ANESTHESIOLOGY

## 2020-08-12 PROCEDURE — 0 IOPAMIDOL 41 % SOLUTION: Performed by: ANESTHESIOLOGY

## 2020-08-12 RX ORDER — METHYLPREDNISOLONE ACETATE 80 MG/ML
80 INJECTION, SUSPENSION INTRA-ARTICULAR; INTRALESIONAL; INTRAMUSCULAR; SOFT TISSUE ONCE
Status: COMPLETED | OUTPATIENT
Start: 2020-08-12 | End: 2020-08-12

## 2020-08-12 RX ORDER — FENTANYL CITRATE 50 UG/ML
50 INJECTION, SOLUTION INTRAMUSCULAR; INTRAVENOUS AS NEEDED
Status: DISCONTINUED | OUTPATIENT
Start: 2020-08-12 | End: 2020-08-13 | Stop reason: HOSPADM

## 2020-08-12 RX ORDER — SODIUM CHLORIDE 0.9 % (FLUSH) 0.9 %
1-10 SYRINGE (ML) INJECTION AS NEEDED
Status: DISCONTINUED | OUTPATIENT
Start: 2020-08-12 | End: 2020-08-13 | Stop reason: HOSPADM

## 2020-08-12 RX ORDER — MIDAZOLAM HYDROCHLORIDE 1 MG/ML
1 INJECTION INTRAMUSCULAR; INTRAVENOUS AS NEEDED
Status: DISCONTINUED | OUTPATIENT
Start: 2020-08-12 | End: 2020-08-13 | Stop reason: HOSPADM

## 2020-08-12 RX ORDER — LIDOCAINE HYDROCHLORIDE 10 MG/ML
1 INJECTION, SOLUTION INFILTRATION; PERINEURAL ONCE AS NEEDED
Status: DISCONTINUED | OUTPATIENT
Start: 2020-08-12 | End: 2020-08-13 | Stop reason: HOSPADM

## 2020-08-12 RX ADMIN — IOPAMIDOL 10 ML: 408 INJECTION, SOLUTION INTRATHECAL at 12:32

## 2020-08-12 RX ADMIN — METHYLPREDNISOLONE ACETATE 80 MG: 80 INJECTION, SUSPENSION INTRA-ARTICULAR; INTRALESIONAL; INTRAMUSCULAR; SOFT TISSUE at 12:33

## 2020-08-12 NOTE — ANESTHESIA PROCEDURE NOTES
PAIN Epidural block      Patient reassessed immediately prior to procedure    Patient location during procedure: pain clinic  Indication:procedure for pain  Performed By  Anesthesiologist: Castro Donato MD  Preanesthetic Checklist  Completed: patient identified and risks and benefits discussed  Additional Notes  Diagnosis:     Post-Op Diagnosis Codes:     * Spinal stenosis of lumbar region with neurogenic claudication (M48.062)    Sedation:  None    First attempt was at the L4 area from both the right and a left paramedian approach but secondary to bony interference is unable to access that level and turned my attention L5 which was accessed easily from the midline approach    A lumbar epidural steroid injection with fluoroscopic guidance and an Isovue dye epidurogram was performed.  Under fluoroscopic guidance, the epidural space was identified and accessed, confirmed by loss of resistance to saline followed by injection of Isovue dye, which shows a good epidurogram.  The above medications were injected uneventfully.    Prep:  Pt Position:prone  Sterile Tech:cap, gloves, mask and sterile barrier  Prep:chlorhexidine gluconate and isopropyl alcohol  Monitoring:blood pressure monitoring, continuous pulse oximetry and EKG  Procedure:Sedation: no     Approach:midline  Guidance: fluoroscopy  Location:lumbar  Interspace: L5-S1.  Needle Type:Tuohy  Needle Gauge:20  Aspiration:negative  Medications:  Depomedrol:80  Preservative Free Saline:1mL  Isovue:1mL  Comments:Isovue dye reveals good epidurogram  Post Assessment:  Pt Tolerance:patient tolerated the procedure well with no apparent complications  Complications:no

## 2020-08-12 NOTE — H&P
"CHIEF COMPLAINT: Back and leg pain      HISTORY OF PRESENT ILLNESS:  He is complaining of low lumbar back pain which radiates down his bilateral lower extremities.  Between a 7 and 8 out of 10 on the pain scale.  Constant timing.  Burning hot spasm aching tingling in nature.  Aggravated by exercise and sitting.  Is affecting his exercise his sleep and his mood.  For over 6 weeks he is tried over-the-counter medications and rest without with a relief.  His MRI shows spinal stenosis    PAST MEDICAL HISTORY:  Current Outpatient Medications on File Prior to Encounter   Medication Sig Dispense Refill   • Cholecalciferol (VITAMIN D3) 5000 units capsule capsule Take 5,000 Units by mouth Daily.     • Immune Globulin, Human,-klhw (Xembify) 1 GM/5ML solution Inject  under the skin into the appropriate area as directed.     • raNITIdine (ZANTAC) 75 MG tablet Take 75 mg by mouth 2 (Two) Times a Day.       No current facility-administered medications on file prior to encounter.        Past Medical History:   Diagnosis Date   • Alcoholism (CMS/HCC)     HISTORY OF 20 YRS AGO   • GERD (gastroesophageal reflux disease)    • Hernia 15+ years ago    Surgically repaired   • Low back pain    • Nasal obstruction     CHRONIC   • Spinal stenosis          SOCIAL HISTORY:  No tobacco    REVIEW OF SYSTEMS:  No hematologic infectious or constitutional symptoms  Other review of systems non-contributory  Negative screen for LUCA      PHYSICAL EXAM:  /84 (BP Location: Left arm, Patient Position: Lying)   Pulse 62   Temp 36.4 °C (97.5 °F) (Oral)   Resp 16   Ht 190.5 cm (75\")   Wt 84.8 kg (187 lb)   SpO2 97%   BMI 23.37 kg/m²   Well-developed well-nourished no acute distress  Extra ocular movements intact  Mallampati class 2 airway  Cardiac:  Regular rate and rhythm  Lungs:  Clear to auscultation bilaterally with good effort  Alert and oriented ×3  Deep tendon reflexes normal in the bilateral patella  Negative straight leg raise " bilaterally  5 out of 5 strength bilateral upper and lower extremities  Lumbar spine without obvious deformities ecchymoses  Lumbar spine nontender to palpation      DIAGNOSIS:  Post-Op Diagnosis Codes:     * Spinal stenosis of lumbar region with neurogenic claudication [M48.062]    PLAN:  1.  Lumbar 4 epidural steroid injections, up to 3, spaced 1-2 weeks apart.  If pain control is acceptable after 1 or 2 injections, it was discussed with the patient that they may return for the subsequent injections if and when their pain returns.  The risks were discussed with the patient including failure of relief, worsening pain, Headache (post dural puncture headache), bleeding (epidural hematoma) and infection (epidural abscess or skin infection).  2.  Physical therapy exercises at home as prescribed by physical therapy or from the pain clinic handout (given to the patient).  Continuation of these exercises every day, or multiple times per week, even when the patient has good pain relief, was stressed to the patient as a preventative measure to decrease the frequency and severity of future pain episodes.  3.  Continue pain medicines as already prescribed.  If patient not currently taking any, it is recommended to begin Acetaminophen 1000 mg po q 8 hours.  If other medicines containing Acetaminophen are currently prescribed, maintain daily dose at 3000 mg.    4.  If they can tolerate NSAIDS, it is recommended to take Ibuprofen 600 mg po q 6 hours for 7 days during pain exacerbations.  Alternatively, they may substitute an NSAID of their choice (e.g. Aleve).  This may be taken at the same time as Acetaminophen.  5.  Heat and ice to the affected area as tolerated for pain control.  It was discussed that heating pads can cause burns.  6.  Daily low impact exercise such as walking or water exercise was recommended to maintain overall health and aid in weight control.   7.  Follow up as needed for subsequent injections.  8.   Patient was counseled to abstain from tobacco products.

## 2020-09-09 ENCOUNTER — APPOINTMENT (OUTPATIENT)
Dept: PAIN MEDICINE | Facility: HOSPITAL | Age: 75
End: 2020-09-09

## 2020-10-07 NOTE — PROGRESS NOTES
Subjective   History of Present Illness: Jhonatan Norman is a 74 y.o. male is here today for follow-up.    Patient was last seen 8/05/2020    Patient had MRI Lumbar 7/22/2020.    Patient states that he is not having any pain in his back, but his experiencing N/T in bilateral feet. He states that the left foot is worst between the two. Patient states that he's feet become so num,b that it feels like he's dragging them when he walks.     Patient states that he is currently not taking any medication for pain.     Since I saw him he had an epidural block and it actually helped his pain.  They have not helped his pain for quite a while.  What is left with this with a lot of numbness which is bothersome to him.  He actually does not have any new weakness other than his chronic left gastrocnemius weakness.  But no foot drops.  But the numbness really bothers him quite a bit.  He is no longer able to do a lot of the things that he was able to do before because they bring on the numbness.  I told him that he might want to consider more lower impact exercises such as alek chi or some gentle yoga or some gentle Pilates and stay away from stationary bicycling and walking.  He used to be very active and he is quite distressed that he is not as active anymore.  But I told him if we operate on him just for his numbness he will probably date very disappointed as it may not help.  Will follow him then.  If he declares himself and has more pain or weakness of both then will do the decompression and fusion from L4-S1.  So we will do a televisit in 4 months and he will let us know if that pain recurs and we can either consider surgery or another block.      Back Pain  The problem occurs intermittently. The problem has been gradually improving since onset. The pain is present in the lumbar spine. The pain radiates to the right foot and left foot. The pain is at a severity of 0/10. The pain is moderate. The symptoms are aggravated by  "sitting. Associated symptoms include leg pain, numbness, tingling and weakness. Pertinent negatives include no bladder incontinence, bowel incontinence, dysuria or fever. (N/T bilateral feet  Intermittent leg pain ) The treatment provided mild relief.       The following portions of the patient's history were reviewed and updated as appropriate: allergies, current medications, past family history, past medical history, past social history, past surgical history and problem list.    Review of Systems   Constitutional: Negative for chills and fever.   HENT: Negative for congestion.    Gastrointestinal: Negative for bowel incontinence.   Genitourinary: Negative for bladder incontinence, difficulty urinating and dysuria.   Musculoskeletal: Positive for back pain and gait problem. Negative for joint swelling.   Neurological: Positive for tingling, weakness and numbness.           Objective     Vitals:    10/19/20 1337   BP: 123/74   Cuff Size: Adult   Pulse: 56   Temp: 97.7 °F (36.5 °C)   Weight: 84.8 kg (187 lb)   Height: 190.5 cm (75\")     Body mass index is 23.37 kg/m².      Physical Exam  Constitutional:       Appearance: He is well-developed.   HENT:      Head: Normocephalic and atraumatic.   Eyes:      Extraocular Movements: EOM normal.      Conjunctiva/sclera: Conjunctivae normal.      Pupils: Pupils are equal, round, and reactive to light.      Funduscopic exam:     Right eye: No papilledema.         Left eye: No papilledema.   Neck:      Vascular: No carotid bruit.   Neurological:      Mental Status: He is oriented to person, place, and time.      Coordination: Finger-Nose-Finger Test and Heel to Shin Test normal.      Gait: Gait is intact.      Deep Tendon Reflexes:      Reflex Scores:       Tricep reflexes are 2+ on the right side and 2+ on the left side.       Bicep reflexes are 2+ on the right side and 2+ on the left side.       Brachioradialis reflexes are 2+ on the right side and 2+ on the left side.       " Patellar reflexes are 2+ on the right side and 2+ on the left side.       Achilles reflexes are 2+ on the right side and 2+ on the left side.  Psychiatric:         Speech: Speech normal.       Neurologic Exam     Mental Status   Oriented to person, place, and time.   Registration of memory: Good recent and remote memory.   Attention: normal. Concentration: normal.   Speech: speech is normal   Level of consciousness: alert  Knowledge: consistent with education.     Cranial Nerves     CN II   Visual fields full to confrontation.   Visual acuity: normal    CN III, IV, VI   Pupils are equal, round, and reactive to light.  Extraocular motions are normal.     CN V   Facial sensation intact.   Right corneal reflex: normal  Left corneal reflex: normal    CN VII   Facial expression full, symmetric.   Right facial weakness: none  Left facial weakness: none    CN VIII   Hearing: intact    CN IX, X   Palate: symmetric    CN XI   Right sternocleidomastoid strength: normal  Left sternocleidomastoid strength: normal    CN XII   Tongue: not atrophic  Tongue deviation: none    Motor Exam   Muscle bulk: normal  Right arm tone: normal  Left arm tone: normal  Right leg tone: normal  Left leg tone: normal    Strength   Strength 5/5 except as noted.   Left gastroc: 2/5    Sensory Exam   Light touch normal.     Gait, Coordination, and Reflexes     Gait  Gait: normal    Coordination   Finger to nose coordination: normal  Heel to shin coordination: normal    Reflexes   Right brachioradialis: 2+  Left brachioradialis: 2+  Right biceps: 2+  Left biceps: 2+  Right triceps: 2+  Left triceps: 2+  Right patellar: 2+  Left patellar: 2+  Right achilles: 2+  Left achilles: 2+  Right : 2+  Left : 2+          Assessment/Plan   Independent Review of Radiographic Studies:      I personally reviewed the images from the following studies.    I reviewed the lumbar MRI and plain x-rays done on 7/22/2020.  Of note he does have a transitional segment.   The spondylolisthesis is at L4-L5 and he is very stenotic at that level and the level below at L5-S1.  There has not really been any dramatic change since 2016.  Agree with the report.    Medical Decision Making:      His pain is better so we will sit tight and do a televisit in 4 months.  If it recurs then he will let us know and we can consider another block.  But as long as it is mainly numbness in his legs is bothering him, I am afraid that not a very good reason to move forward with a decompression and fusion.  Has to be recurrent leg pain or weakness or both.      Diagnoses and all orders for this visit:    1. Spondylolisthesis at L4-L5 level (Primary)    2. Spinal stenosis of lumbar region with neurogenic claudication    3. Calf muscle weakness      Return in about 4 months (around 2/19/2021) for As a televisit.

## 2020-10-19 ENCOUNTER — OFFICE VISIT (OUTPATIENT)
Dept: NEUROSURGERY | Facility: CLINIC | Age: 75
End: 2020-10-19

## 2020-10-19 VITALS
HEART RATE: 56 BPM | HEIGHT: 75 IN | DIASTOLIC BLOOD PRESSURE: 74 MMHG | WEIGHT: 187 LBS | TEMPERATURE: 97.7 F | SYSTOLIC BLOOD PRESSURE: 123 MMHG | BODY MASS INDEX: 23.25 KG/M2

## 2020-10-19 DIAGNOSIS — M48.062 SPINAL STENOSIS OF LUMBAR REGION WITH NEUROGENIC CLAUDICATION: ICD-10-CM

## 2020-10-19 DIAGNOSIS — M43.16 SPONDYLOLISTHESIS AT L4-L5 LEVEL: Primary | ICD-10-CM

## 2020-10-19 DIAGNOSIS — M62.81 CALF MUSCLE WEAKNESS: ICD-10-CM

## 2020-10-19 PROCEDURE — 99214 OFFICE O/P EST MOD 30 MIN: CPT | Performed by: NEUROLOGICAL SURGERY

## 2021-02-09 NOTE — PROGRESS NOTES
Subjective   Patient ID: Jhonatan Norman is a 75 y.o. male is here today for follow-up via telephone visit.    You have chosen to receive care through a telephone visit. Do you consent to use a telephone visit for your medical care today? Yes    Patient was last seen in the office on 10/19/2020 with Dr. Perico Khalil MD for back pain.     Today, the patient reports his back pain has improved, his numbness in his lower extremity's has also improved. He bought a new exercise bike at home that he uses 5 days a week.    This was a televisit from my office lasting 8 minutes.  The patient was at home.  He is actually improved.  He got a recumbent bicycle and he is able to do a little bit more exercise.  I told him to try and do something with his upper body while he is spending to 30 to 40 minutes on the bicycle.  The numbness is even better.  The pain is better.  We will obviously hold off on surgery.  He will let me know if things worsen.  But I will do a televisit in 6 months with him.        History of Present Illness    The following portions of the patient's history were reviewed and updated as appropriate: allergies, current medications, past family history, past medical history, past social history, past surgical history and problem list.    Review of Systems   Respiratory: Negative for chest tightness and shortness of breath.    Cardiovascular: Negative for chest pain.   Musculoskeletal: Positive for back pain and myalgias.   Neurological: Numbness: Improved.           Objective     Physical Exam   Deferred  Neurologic Exam   Deferred        Assessment/Plan   Independent Review of Radiographic Studies:      I personally reviewed the images from the following studies.    I reviewed the lumbar MRI and plain x-rays done on 7/22/2020.  Of note he does have a transitional segment.  The spondylolisthesis is at L4-L5 and he is very stenotic at that level and the level below at L5-S1.  There has not really been any  dramatic change since 2016.  Agree with the report.    Medical Decision Making:      Overall improved, obviously will place aside the idea of surgery.  We will keep an eye on him and do a televisit in 6 months.      Diagnoses and all orders for this visit:    1. Spondylolisthesis at L4-L5 level (Primary)    2. Spinal stenosis of lumbar region with neurogenic claudication    3. Calf muscle weakness      Return in about 6 months (around 8/15/2021) for As a televisit.

## 2021-02-15 ENCOUNTER — OFFICE VISIT (OUTPATIENT)
Dept: NEUROSURGERY | Facility: CLINIC | Age: 76
End: 2021-02-15

## 2021-02-15 DIAGNOSIS — M48.062 SPINAL STENOSIS OF LUMBAR REGION WITH NEUROGENIC CLAUDICATION: ICD-10-CM

## 2021-02-15 DIAGNOSIS — M62.81 CALF MUSCLE WEAKNESS: ICD-10-CM

## 2021-02-15 DIAGNOSIS — M43.16 SPONDYLOLISTHESIS AT L4-L5 LEVEL: Primary | ICD-10-CM

## 2021-02-15 PROCEDURE — 99441 PR PHYS/QHP TELEPHONE EVALUATION 5-10 MIN: CPT | Performed by: NEUROLOGICAL SURGERY

## 2021-03-09 DIAGNOSIS — Z23 IMMUNIZATION DUE: ICD-10-CM

## 2021-04-19 ENCOUNTER — HOSPITAL ENCOUNTER (OUTPATIENT)
Dept: GENERAL RADIOLOGY | Facility: HOSPITAL | Age: 76
Discharge: HOME | End: 2021-04-19
Admitting: INTERNAL MEDICINE

## 2021-04-19 DIAGNOSIS — M79.671 RIGHT FOOT PAIN: ICD-10-CM

## 2021-04-19 PROCEDURE — 73630 X-RAY EXAM OF FOOT: CPT

## 2021-04-21 ENCOUNTER — HOSPITAL ENCOUNTER (INPATIENT)
Facility: HOSPITAL | Age: 76
LOS: 2 days | Discharge: HOME | End: 2021-04-24
Attending: EMERGENCY MEDICINE | Admitting: INTERNAL MEDICINE

## 2021-04-21 ENCOUNTER — APPOINTMENT (OUTPATIENT)
Dept: GENERAL RADIOLOGY | Facility: HOSPITAL | Age: 76
End: 2021-04-21

## 2021-04-21 DIAGNOSIS — L03.115 CELLULITIS OF RIGHT LOWER EXTREMITY: Primary | ICD-10-CM

## 2021-04-21 DIAGNOSIS — R50.9 FEVER AND CHILLS: ICD-10-CM

## 2021-04-21 DIAGNOSIS — R78.81 BACTEREMIA: ICD-10-CM

## 2021-04-21 DIAGNOSIS — D84.9 IMMUNOCOMPROMISED PATIENT (HCC): ICD-10-CM

## 2021-04-21 LAB
ALBUMIN SERPL-MCNC: 3.6 G/DL (ref 3.5–5.2)
ALBUMIN/GLOB SERPL: 1.1 G/DL
ALP SERPL-CCNC: 60 U/L (ref 39–117)
ALT SERPL W P-5'-P-CCNC: 34 U/L (ref 1–41)
ANION GAP SERPL CALCULATED.3IONS-SCNC: 11.5 MMOL/L (ref 5–15)
AST SERPL-CCNC: 35 U/L (ref 1–40)
BASOPHILS # BLD AUTO: 0.02 10*3/MM3 (ref 0–0.2)
BASOPHILS NFR BLD AUTO: 0.2 % (ref 0–1.5)
BILIRUB SERPL-MCNC: 0.9 MG/DL (ref 0–1.2)
BUN SERPL-MCNC: 20 MG/DL (ref 8–23)
BUN/CREAT SERPL: 19 (ref 7–25)
CALCIUM SPEC-SCNC: 8.6 MG/DL (ref 8.6–10.5)
CHLORIDE SERPL-SCNC: 94 MMOL/L (ref 98–107)
CO2 SERPL-SCNC: 24.5 MMOL/L (ref 22–29)
CREAT SERPL-MCNC: 1.05 MG/DL (ref 0.76–1.27)
CRP SERPL-MCNC: 18.18 MG/DL (ref 0–0.5)
D-LACTATE SERPL-SCNC: 1 MMOL/L (ref 0.5–2)
DEPRECATED RDW RBC AUTO: 38 FL (ref 37–54)
EOSINOPHIL # BLD AUTO: 0 10*3/MM3 (ref 0–0.4)
EOSINOPHIL NFR BLD AUTO: 0 % (ref 0.3–6.2)
ERYTHROCYTE [DISTWIDTH] IN BLOOD BY AUTOMATED COUNT: 11.4 % (ref 12.3–15.4)
ERYTHROCYTE [SEDIMENTATION RATE] IN BLOOD: 48 MM/HR (ref 0–20)
GFR SERPL CREATININE-BSD FRML MDRD: 69 ML/MIN/1.73
GLOBULIN UR ELPH-MCNC: 3.3 GM/DL
GLUCOSE SERPL-MCNC: 121 MG/DL (ref 65–99)
HCT VFR BLD AUTO: 39.7 % (ref 37.5–51)
HGB BLD-MCNC: 14 G/DL (ref 13–17.7)
IMM GRANULOCYTES # BLD AUTO: 0.06 10*3/MM3 (ref 0–0.05)
IMM GRANULOCYTES NFR BLD AUTO: 0.5 % (ref 0–0.5)
LYMPHOCYTES # BLD AUTO: 1.19 10*3/MM3 (ref 0.7–3.1)
LYMPHOCYTES NFR BLD AUTO: 9 % (ref 19.6–45.3)
MCH RBC QN AUTO: 32.6 PG (ref 26.6–33)
MCHC RBC AUTO-ENTMCNC: 35.3 G/DL (ref 31.5–35.7)
MCV RBC AUTO: 92.3 FL (ref 79–97)
MONOCYTES # BLD AUTO: 1.14 10*3/MM3 (ref 0.1–0.9)
MONOCYTES NFR BLD AUTO: 8.7 % (ref 5–12)
NEUTROPHILS NFR BLD AUTO: 10.74 10*3/MM3 (ref 1.7–7)
NEUTROPHILS NFR BLD AUTO: 81.6 % (ref 42.7–76)
NRBC BLD AUTO-RTO: 0 /100 WBC (ref 0–0.2)
PLATELET # BLD AUTO: 203 10*3/MM3 (ref 140–450)
PMV BLD AUTO: 9.1 FL (ref 6–12)
POTASSIUM SERPL-SCNC: 3.8 MMOL/L (ref 3.5–5.2)
PROCALCITONIN SERPL-MCNC: 0.28 NG/ML (ref 0–0.25)
PROT SERPL-MCNC: 6.9 G/DL (ref 6–8.5)
RBC # BLD AUTO: 4.3 10*6/MM3 (ref 4.14–5.8)
SODIUM SERPL-SCNC: 130 MMOL/L (ref 136–145)
WBC # BLD AUTO: 13.15 10*3/MM3 (ref 3.4–10.8)

## 2021-04-21 PROCEDURE — 86618 LYME DISEASE ANTIBODY: CPT | Performed by: PHYSICIAN ASSISTANT

## 2021-04-21 PROCEDURE — 86753 PROTOZOA ANTIBODY NOS: CPT | Performed by: PHYSICIAN ASSISTANT

## 2021-04-21 PROCEDURE — 85025 COMPLETE CBC W/AUTO DIFF WBC: CPT | Performed by: PHYSICIAN ASSISTANT

## 2021-04-21 PROCEDURE — 83605 ASSAY OF LACTIC ACID: CPT | Performed by: PHYSICIAN ASSISTANT

## 2021-04-21 PROCEDURE — U0004 COV-19 TEST NON-CDC HGH THRU: HCPCS | Performed by: PHYSICIAN ASSISTANT

## 2021-04-21 PROCEDURE — 86140 C-REACTIVE PROTEIN: CPT | Performed by: PHYSICIAN ASSISTANT

## 2021-04-21 PROCEDURE — 77002 NEEDLE LOCALIZATION BY XRAY: CPT

## 2021-04-21 PROCEDURE — 80053 COMPREHEN METABOLIC PANEL: CPT | Performed by: PHYSICIAN ASSISTANT

## 2021-04-21 PROCEDURE — 99285 EMERGENCY DEPT VISIT HI MDM: CPT

## 2021-04-21 PROCEDURE — 87040 BLOOD CULTURE FOR BACTERIA: CPT | Performed by: INTERNAL MEDICINE

## 2021-04-21 PROCEDURE — 84145 PROCALCITONIN (PCT): CPT | Performed by: PHYSICIAN ASSISTANT

## 2021-04-21 PROCEDURE — 25010000002 VANCOMYCIN PER 500 MG: Performed by: EMERGENCY MEDICINE

## 2021-04-21 PROCEDURE — 87040 BLOOD CULTURE FOR BACTERIA: CPT | Performed by: PHYSICIAN ASSISTANT

## 2021-04-21 PROCEDURE — U0005 INFEC AGEN DETEC AMPLI PROBE: HCPCS | Performed by: PHYSICIAN ASSISTANT

## 2021-04-21 PROCEDURE — 85652 RBC SED RATE AUTOMATED: CPT | Performed by: PHYSICIAN ASSISTANT

## 2021-04-21 PROCEDURE — 86757 RICKETTSIA ANTIBODY: CPT | Performed by: PHYSICIAN ASSISTANT

## 2021-04-21 PROCEDURE — 86666 EHRLICHIA ANTIBODY: CPT | Performed by: PHYSICIAN ASSISTANT

## 2021-04-21 PROCEDURE — 71045 X-RAY EXAM CHEST 1 VIEW: CPT

## 2021-04-21 PROCEDURE — 25010000002 CEFTRIAXONE PER 250 MG: Performed by: EMERGENCY MEDICINE

## 2021-04-21 RX ORDER — SODIUM CHLORIDE 0.9 % (FLUSH) 0.9 %
10 SYRINGE (ML) INJECTION AS NEEDED
Status: DISCONTINUED | OUTPATIENT
Start: 2021-04-21 | End: 2021-04-24 | Stop reason: HOSPADM

## 2021-04-21 RX ORDER — CEFTRIAXONE SODIUM 1 G/50ML
1 INJECTION, SOLUTION INTRAVENOUS ONCE
Status: COMPLETED | OUTPATIENT
Start: 2021-04-21 | End: 2021-04-21

## 2021-04-21 RX ORDER — ONDANSETRON 2 MG/ML
4 INJECTION INTRAMUSCULAR; INTRAVENOUS ONCE AS NEEDED
Status: DISCONTINUED | OUTPATIENT
Start: 2021-04-21 | End: 2021-04-22

## 2021-04-21 RX ORDER — VANCOMYCIN HYDROCHLORIDE 1 G/200ML
1000 INJECTION, SOLUTION INTRAVENOUS ONCE
Status: COMPLETED | OUTPATIENT
Start: 2021-04-21 | End: 2021-04-22

## 2021-04-21 RX ORDER — HYDROMORPHONE HYDROCHLORIDE 1 MG/ML
0.5 INJECTION, SOLUTION INTRAMUSCULAR; INTRAVENOUS; SUBCUTANEOUS ONCE AS NEEDED
Status: DISCONTINUED | OUTPATIENT
Start: 2021-04-21 | End: 2021-04-22

## 2021-04-21 RX ORDER — HYDROMORPHONE HYDROCHLORIDE 1 MG/ML
0.5 INJECTION, SOLUTION INTRAMUSCULAR; INTRAVENOUS; SUBCUTANEOUS AS NEEDED
Status: DISCONTINUED | OUTPATIENT
Start: 2021-04-21 | End: 2021-04-21

## 2021-04-21 RX ADMIN — VANCOMYCIN HYDROCHLORIDE 1000 MG: 1 INJECTION, SOLUTION INTRAVENOUS at 23:16

## 2021-04-21 RX ADMIN — CEFTRIAXONE SODIUM 1 G: 1 INJECTION, SOLUTION INTRAVENOUS at 22:31

## 2021-04-22 ENCOUNTER — APPOINTMENT (OUTPATIENT)
Dept: GENERAL RADIOLOGY | Facility: HOSPITAL | Age: 76
End: 2021-04-22

## 2021-04-22 PROBLEM — L03.115 CELLULITIS OF RIGHT LOWER EXTREMITY: Status: ACTIVE | Noted: 2021-04-22

## 2021-04-22 LAB
ANION GAP SERPL CALCULATED.3IONS-SCNC: 14.7 MMOL/L (ref 5–15)
APPEARANCE FLD: ABNORMAL
BUN SERPL-MCNC: 17 MG/DL (ref 8–23)
BUN/CREAT SERPL: 17.2 (ref 7–25)
CALCIUM SPEC-SCNC: 8.7 MG/DL (ref 8.6–10.5)
CHLORIDE SERPL-SCNC: 102 MMOL/L (ref 98–107)
CO2 SERPL-SCNC: 22.3 MMOL/L (ref 22–29)
COLOR FLD: YELLOW
CREAT SERPL-MCNC: 0.99 MG/DL (ref 0.76–1.27)
CRYSTALS FLD MICRO: NORMAL
GFR SERPL CREATININE-BSD FRML MDRD: 74 ML/MIN/1.73
GLUCOSE SERPL-MCNC: 104 MG/DL (ref 65–99)
LYMPHOCYTES NFR FLD MANUAL: 3 %
METHOD: ABNORMAL
MONOCYTES NFR FLD: 14 %
NEUTROPHILS NFR FLD MANUAL: 83 %
NUC CELL # FLD: 1395 /MM3
POTASSIUM SERPL-SCNC: 4.1 MMOL/L (ref 3.5–5.2)
RBC # FLD AUTO: 1645 /MM3
SARS-COV-2 ORF1AB RESP QL NAA+PROBE: NOT DETECTED
SODIUM SERPL-SCNC: 139 MMOL/L (ref 136–145)

## 2021-04-22 PROCEDURE — 99222 1ST HOSP IP/OBS MODERATE 55: CPT | Performed by: INTERNAL MEDICINE

## 2021-04-22 PROCEDURE — 87070 CULTURE OTHR SPECIMN AEROBIC: CPT | Performed by: INTERNAL MEDICINE

## 2021-04-22 PROCEDURE — 25010000002 VANCOMYCIN 750 MG RECONSTITUTED SOLUTION: Performed by: INTERNAL MEDICINE

## 2021-04-22 PROCEDURE — 63710000001 ONDANSETRON PER 8 MG: Performed by: INTERNAL MEDICINE

## 2021-04-22 PROCEDURE — 0S9F3ZX DRAINAGE OF RIGHT ANKLE JOINT, PERCUTANEOUS APPROACH, DIAGNOSTIC: ICD-10-PCS | Performed by: INTERNAL MEDICINE

## 2021-04-22 PROCEDURE — 25010000002 ENOXAPARIN PER 10 MG: Performed by: INTERNAL MEDICINE

## 2021-04-22 PROCEDURE — 87205 SMEAR GRAM STAIN: CPT | Performed by: INTERNAL MEDICINE

## 2021-04-22 PROCEDURE — 77002 NEEDLE LOCALIZATION BY XRAY: CPT

## 2021-04-22 PROCEDURE — 80048 BASIC METABOLIC PNL TOTAL CA: CPT | Performed by: INTERNAL MEDICINE

## 2021-04-22 PROCEDURE — 89051 BODY FLUID CELL COUNT: CPT | Performed by: INTERNAL MEDICINE

## 2021-04-22 PROCEDURE — 25010000003 LIDOCAINE 1 % SOLUTION: Performed by: INTERNAL MEDICINE

## 2021-04-22 PROCEDURE — 89060 EXAM SYNOVIAL FLUID CRYSTALS: CPT | Performed by: INTERNAL MEDICINE

## 2021-04-22 RX ORDER — BISACODYL 10 MG
10 SUPPOSITORY, RECTAL RECTAL DAILY PRN
Status: DISCONTINUED | OUTPATIENT
Start: 2021-04-22 | End: 2021-04-24 | Stop reason: HOSPADM

## 2021-04-22 RX ORDER — NITROGLYCERIN 0.4 MG/1
0.4 TABLET SUBLINGUAL
Status: DISCONTINUED | OUTPATIENT
Start: 2021-04-22 | End: 2021-04-24 | Stop reason: HOSPADM

## 2021-04-22 RX ORDER — CALCIUM CARBONATE 200(500)MG
2 TABLET,CHEWABLE ORAL 2 TIMES DAILY PRN
Status: DISCONTINUED | OUTPATIENT
Start: 2021-04-22 | End: 2021-04-24 | Stop reason: HOSPADM

## 2021-04-22 RX ORDER — SODIUM CHLORIDE 0.9 % (FLUSH) 0.9 %
10 SYRINGE (ML) INJECTION EVERY 12 HOURS SCHEDULED
Status: DISCONTINUED | OUTPATIENT
Start: 2021-04-22 | End: 2021-04-24 | Stop reason: HOSPADM

## 2021-04-22 RX ORDER — SODIUM CHLORIDE 0.9 % (FLUSH) 0.9 %
10 SYRINGE (ML) INJECTION AS NEEDED
Status: DISCONTINUED | OUTPATIENT
Start: 2021-04-22 | End: 2021-04-24 | Stop reason: HOSPADM

## 2021-04-22 RX ORDER — LIDOCAINE HYDROCHLORIDE 10 MG/ML
10 INJECTION, SOLUTION INFILTRATION; PERINEURAL ONCE
Status: COMPLETED | OUTPATIENT
Start: 2021-04-22 | End: 2021-04-22

## 2021-04-22 RX ORDER — CHOLECALCIFEROL (VITAMIN D3) 125 MCG
5 CAPSULE ORAL NIGHTLY PRN
Status: DISCONTINUED | OUTPATIENT
Start: 2021-04-22 | End: 2021-04-24 | Stop reason: HOSPADM

## 2021-04-22 RX ORDER — ACETAMINOPHEN 160 MG/5ML
650 SOLUTION ORAL EVERY 4 HOURS PRN
Status: DISCONTINUED | OUTPATIENT
Start: 2021-04-22 | End: 2021-04-24 | Stop reason: HOSPADM

## 2021-04-22 RX ORDER — AMOXICILLIN 250 MG
2 CAPSULE ORAL 2 TIMES DAILY
Status: DISCONTINUED | OUTPATIENT
Start: 2021-04-22 | End: 2021-04-24 | Stop reason: HOSPADM

## 2021-04-22 RX ORDER — ONDANSETRON 2 MG/ML
4 INJECTION INTRAMUSCULAR; INTRAVENOUS EVERY 6 HOURS PRN
Status: DISCONTINUED | OUTPATIENT
Start: 2021-04-22 | End: 2021-04-24 | Stop reason: HOSPADM

## 2021-04-22 RX ORDER — ACETAMINOPHEN 325 MG/1
650 TABLET ORAL EVERY 4 HOURS PRN
Status: DISCONTINUED | OUTPATIENT
Start: 2021-04-22 | End: 2021-04-24 | Stop reason: HOSPADM

## 2021-04-22 RX ORDER — POLYETHYLENE GLYCOL 3350 17 G/17G
17 POWDER, FOR SOLUTION ORAL DAILY PRN
Status: DISCONTINUED | OUTPATIENT
Start: 2021-04-22 | End: 2021-04-24 | Stop reason: HOSPADM

## 2021-04-22 RX ORDER — PANTOPRAZOLE SODIUM 40 MG/1
40 TABLET, DELAYED RELEASE ORAL ONCE
Status: COMPLETED | OUTPATIENT
Start: 2021-04-22 | End: 2021-04-22

## 2021-04-22 RX ORDER — OXYCODONE AND ACETAMINOPHEN 7.5; 325 MG/1; MG/1
2 TABLET ORAL EVERY 4 HOURS PRN
Status: DISCONTINUED | OUTPATIENT
Start: 2021-04-22 | End: 2021-04-22

## 2021-04-22 RX ORDER — ONDANSETRON 4 MG/1
4 TABLET, FILM COATED ORAL EVERY 6 HOURS PRN
Status: DISCONTINUED | OUTPATIENT
Start: 2021-04-22 | End: 2021-04-24 | Stop reason: HOSPADM

## 2021-04-22 RX ORDER — SODIUM CHLORIDE 450 MG/100ML
75 INJECTION, SOLUTION INTRAVENOUS CONTINUOUS
Status: DISCONTINUED | OUTPATIENT
Start: 2021-04-22 | End: 2021-04-23

## 2021-04-22 RX ORDER — VANCOMYCIN HYDROCHLORIDE 1 G/200ML
1000 INJECTION, SOLUTION INTRAVENOUS ONCE
Status: DISCONTINUED | OUTPATIENT
Start: 2021-04-22 | End: 2021-04-22 | Stop reason: DRUGHIGH

## 2021-04-22 RX ORDER — ACETAMINOPHEN 650 MG/1
650 SUPPOSITORY RECTAL EVERY 4 HOURS PRN
Status: DISCONTINUED | OUTPATIENT
Start: 2021-04-22 | End: 2021-04-24 | Stop reason: HOSPADM

## 2021-04-22 RX ORDER — TRAMADOL HYDROCHLORIDE 50 MG/1
50 TABLET ORAL EVERY 6 HOURS PRN
Status: DISCONTINUED | OUTPATIENT
Start: 2021-04-22 | End: 2021-04-22

## 2021-04-22 RX ORDER — BISACODYL 5 MG/1
5 TABLET, DELAYED RELEASE ORAL DAILY PRN
Status: DISCONTINUED | OUTPATIENT
Start: 2021-04-22 | End: 2021-04-24 | Stop reason: HOSPADM

## 2021-04-22 RX ADMIN — SODIUM CHLORIDE, PRESERVATIVE FREE 10 ML: 5 INJECTION INTRAVENOUS at 19:43

## 2021-04-22 RX ADMIN — ONDANSETRON HYDROCHLORIDE 4 MG: 4 TABLET, FILM COATED ORAL at 22:44

## 2021-04-22 RX ADMIN — PANTOPRAZOLE SODIUM 40 MG: 40 TABLET, DELAYED RELEASE ORAL at 00:29

## 2021-04-22 RX ADMIN — SODIUM CHLORIDE, PRESERVATIVE FREE 10 ML: 5 INJECTION INTRAVENOUS at 08:17

## 2021-04-22 RX ADMIN — SODIUM CHLORIDE 75 ML/HR: 4.5 INJECTION, SOLUTION INTRAVENOUS at 03:46

## 2021-04-22 RX ADMIN — LIDOCAINE HYDROCHLORIDE 10 ML: 10 INJECTION, SOLUTION INFILTRATION; PERINEURAL at 16:43

## 2021-04-22 RX ADMIN — ENOXAPARIN SODIUM 40 MG: 40 INJECTION SUBCUTANEOUS at 08:17

## 2021-04-22 RX ADMIN — ACETAMINOPHEN 650 MG: 325 TABLET, FILM COATED ORAL at 18:35

## 2021-04-22 RX ADMIN — DOCUSATE SODIUM 50MG AND SENNOSIDES 8.6MG 2 TABLET: 8.6; 5 TABLET, FILM COATED ORAL at 08:17

## 2021-04-22 RX ADMIN — SODIUM CHLORIDE 750 MG: 900 INJECTION, SOLUTION INTRAVENOUS at 10:41

## 2021-04-22 RX ADMIN — ACETAMINOPHEN 650 MG: 325 TABLET, FILM COATED ORAL at 22:44

## 2021-04-22 RX ADMIN — OXYCODONE HYDROCHLORIDE AND ACETAMINOPHEN 1 TABLET: 7.5; 325 TABLET ORAL at 07:27

## 2021-04-22 RX ADMIN — Medication 5 MG: at 22:44

## 2021-04-22 RX ADMIN — DOCUSATE SODIUM 50MG AND SENNOSIDES 8.6MG 2 TABLET: 8.6; 5 TABLET, FILM COATED ORAL at 19:43

## 2021-04-22 RX ADMIN — SODIUM CHLORIDE 75 ML/HR: 4.5 INJECTION, SOLUTION INTRAVENOUS at 14:02

## 2021-04-22 RX ADMIN — SODIUM CHLORIDE 750 MG: 900 INJECTION, SOLUTION INTRAVENOUS at 22:44

## 2021-04-22 RX ADMIN — OXYCODONE HYDROCHLORIDE AND ACETAMINOPHEN 2 TABLET: 7.5; 325 TABLET ORAL at 03:28

## 2021-04-22 NOTE — PROGRESS NOTES
"[facility]  Clinical Pharmacy Department     Vancomycin Pharmacokinetic Note    Jhonatan Norman is a 75 y.o. male who is on day 2 of pharmacy to dose vancomycin for SSTI.    Target level: AUC24 400-600  Consulting Provider:  Dr. Tracy   Current Vancomycin Dose: TBD  Planned Duration of Therapy: 5 days   Other Antimicrobials: None at this time     Allergies  Allergies as of 04/21/2021    (No Known Allergies)       Microbiology:  Microbiology Results (last 10 days)       Procedure Component Value - Date/Time    COVID PRE-OP / PRE-PROCEDURE SCREENING ORDER (NO ISOLATION) - Swab, Nasopharynx [[id number]]  (Normal) Collected: 04/21/21 2230    Lab Status: Final result Specimen: Swab from Nasopharynx Updated: 04/22/21 0237    Narrative:      The following orders were created for panel order COVID PRE-OP / PRE-PROCEDURE SCREENING ORDER (NO ISOLATION) - Swab, Nasopharynx.  Procedure                               Abnormality         Status                     ---------                               -----------         ------                     COVID-19,APTIMA PANTHER,...[[id number]]  Normal              Final result                 Please view results for these tests on the individual orders.    COVID-19,APTIMA PANTHER,[facility] IN-HOUSE, NP/OP SWAB IN UTM/VTM/SALINE TRANSPORT MEDIA,24 HR TAT - Swab, Nasopharynx [[id number]]  (Normal) Collected: 04/21/21 2230    Lab Status: Final result Specimen: Swab from Nasopharynx Updated: 04/22/21 0237     COVID19 Not Detected    Narrative:      Fact sheet for providers: https://www.fda.gov/media/552281/download     Fact sheet for patients: https://www.fda.gov/media/991727/download    Test performed by RT PCR.          Vitals/Labs/I&O  190.5 cm (75\")  85.3 kg (188 lb)  Body mass index is 23.5 kg/m².   [unfilled]    Results from last 7 days   Lab Units 04/21/21 2220   WBC 10*3/mm3 13.15*     Results from last 7 days   Lab Units 04/21/21 2220   LACTATE mmol/L 1.0      Results from last " "7 days   Lab Units 04/21/21  2220   PROCALCITONIN ng/mL 0.28*     Results from last 7 days   Lab Units 04/21/21  2220   CRP mg/dL 18.18*       Results from last 7 days   Lab Units 04/21/21  2220   SED RATE mm/hr 48*        Estimated Creatinine Clearance: 79 mL/min (by C-G formula based on SCr of 0.99 mg/dL).  Results from last 7 days   Lab Units 04/22/21  0428 04/21/21  2220   BUN mg/dL 17 20   CREATININE mg/dL 0.99 1.05     Intake & Output (last 3 days)         04/19 0701 - 04/20 0700 04/20 0701 - 04/21 0700 04/21 0701 - 04/22 0700 04/22 0701 - 04/23 0700    IV Piggyback   250     Total Intake(mL/kg)   250 (2.9)     Urine (mL/kg/hr)   500 500 (1.8)    Total Output   500 500    Net   -250 -500                    Vancomycin Dosing and Level History:  4/21 Vancomycin 1000 mg x 1: 2316        Assessment/Plan:  Scr appears to be at baseline. The patient received a dose of vancomycin 1000 mg (~12 mg/kg) x 1 in the ED prior to consult. I will follow this with vancomycin 750 mg every 12 hours. This regimen is estimated to achieve an AUC of 449 mg/L*hr (Goal 400-600) and a steady state trough of 14.9 mg/L.  A trough will be collected tomorrow, prior to the 4th dose.   Scr will be obtained daily for the 1st three days of therapy and then at least every 48 hours while on active IV therapy.     Bayesian analysis of the most recent level(s) using Lingorami provides the following patient-specific pharmacokinetic parameters:   CL: 3.2 L/h   Vd: 71.2 L   T1/2: 15.7 h  If the predicted trough on this regimen is not within what was previously considered a \"target trough range\", the AUC24 (a stronger predictor of vancomycin efficacy) is predicted to achieve the accepted target of 400-600 mg*h/L. To best balance efficacy and toxicity, we will be targeting AUC24 in this patient rather than steady-state trough levels.     Thank you for involving pharmacy in this patient's care. Please contact pharmacy with any questions or concerns.     "                       Tiera Bryant [facility]  Clinical Pharmacist  04/22/21 10:13 EDT

## 2021-04-22 NOTE — ED PROVIDER NOTES
EMERGENCY DEPARTMENT ENCOUNTER    Room Number:  33/33  Date of encounter:  4/22/2021  PCP: Aidan Ross MD  Historian: Patient      HPI:  Chief Complaint: Lower extremity pain and swelling with fever  A complete HPI/ROS/PMH/PSH/SH/FH are unobtainable due to: Nothing    Context: Jhonatan Norman is a 75 y.o. male who presents to the ED c/o right lower extremity pain and swelling with fever.  She states he went turkey hunting Saturday in [facility].  Since that time his right lower extremity starting with the foot is turning red, swollen, painful and seems to be spreading proximally.  He informs me just to days ago he had an x-ray of the right foot which was found to be unremarkable.  He is not currently taking any antibiotics.  He does inform me he has a history of being immunocompromised.      PAST MEDICAL HISTORY  Active Ambulatory Problems     Diagnosis Date Noted   • Spinal stenosis of lumbar region 08/05/2016   • Degeneration of intervertebral disc of lumbosacral region 08/05/2016   • Gastroesophageal reflux disease 01/09/2018   • Cervical radiculopathy 06/05/2019   • Pain in thoracic spine 06/05/2019   • DDD (degenerative disc disease), thoracic 07/19/2019   • Calf muscle weakness 07/06/2020   • Spondylolisthesis at L4-L5 level 08/05/2020     Resolved Ambulatory Problems     Diagnosis Date Noted   • No Resolved Ambulatory Problems     Past Medical History:   Diagnosis Date   • Alcoholism (CMS/[facility])    • GERD (gastroesophageal reflux disease)    • Hernia 15+ years ago   • Low back pain    • Nasal obstruction    • Spinal stenosis          PAST SURGICAL HISTORY  Past Surgical History:   Procedure Laterality Date   • ABDOMINAL SURGERY  1975    vagotomy    pylorplasty   • BACK SURGERY      lumbar decompression   • CHOLECYSTECTOMY     • COLONOSCOPY  02/28/2013    fair prep, tics, stool, torts, IH   • ENDOSCOPIC FUNCTIONAL SINUS SURGERY (FESS) N/A 2/20/2019    Procedure: ENDOSCOPIC FUNCTIONAL SINUS SURGERY;   Surgeon: Kareem Weaver MD;  Location: [facility] OR [facility];  Service: ENT   • ENDOSCOPY  2013    Z line irregular, 45 cm from incisors, torts, HH, bilious gastric fluiod, gastritis, pylorplasty found, Mora's, reactive gastropathychronic inflammation, esophagitis,    • ENDOSCOPY N/A 2018    Z-line irregular, 43 cm from the incisors, gastritis, a pyroloplasty was found, characterized by healthy appearing mucosa, normal duodenal bulb and second portion of the duodenum   • EPIDURAL BLOCK     • EYE SURGERY      CATARACTS   • HERNIA REPAIR Right     inguinal hernia   • SEPTOPLASTY Bilateral 2019    Procedure: NASAL SEPTOPLASTY BILATERAL INFERIOR TURBINECTOMY;  Surgeon: Kareem Weaver MD;  Location: [facility] OR [facility];  Service: ENT   • UPPER GASTROINTESTINAL ENDOSCOPY  2 weeks ago   • VAGOTOMY AND PYLOROPLASTY           FAMILY HISTORY  Family History   Problem Relation Age of Onset   • Cancer Other    • Ulcers Son    • Irritable bowel syndrome Daughter    • Diverticulitis Daughter    • Alcohol abuse Father    • Malig Hyperthermia Neg Hx          SOCIAL HISTORY  Social History     Socioeconomic History   • Marital status:      Spouse name: Not on file   • Number of children: Not on file   • Years of education: college graduate   • Highest education level: Not on file   Tobacco Use   • Smoking status: Former Smoker     Packs/day: 2.00     Years: 10.00     Pack years: 20.00     Types: Cigarettes     Start date:      Quit date:      Years since quittin.3   • Smokeless tobacco: Never Used   Substance and Sexual Activity   • Alcohol use: No     Comment: No alcohol for 20 years     • Drug use: No   • Sexual activity: Defer         ALLERGIES  Patient has no known allergies.        REVIEW OF SYSTEMS  Review of Systems   Constitutional: Positive for chills and fever.   HENT: Negative.    Respiratory: Negative for cough and shortness of breath.    Cardiovascular: Negative for chest pain,  palpitations and leg swelling.   Gastrointestinal: Negative.    Genitourinary: Negative.    Musculoskeletal: Negative.         All systems reviewed and negative except for those discussed in HPI.       PHYSICAL EXAM    I have reviewed the triage vital signs and nursing notes.    ED Triage Vitals   Temp Heart Rate Resp BP SpO2   04/21/21 2054 04/21/21 2053 04/21/21 2053 04/21/21 2053 04/21/21 2053   (!) 101.4 °F (38.6 °C) 84 18 116/67 95 %      Temp src Heart Rate Source Patient Position BP Location FiO2 (%)   -- -- -- -- --              Physical Exam  GENERAL: Very pleasant, well-nourished, nontoxic  HENT: nares patent, face symmetric, mucous memories moist  EYES: no scleral icterus  CV: regular rhythm, regular rate, palpable pedal pulses bilaterally  RESPIRATORY: normal effort, lungs CTA B   ABDOMEN: soft, nontender  MUSCULOSKELETAL: no deformity, lower extremity compartments are soft  NEURO: alert and oriented x4, moves all extremities, follows commands  SKIN: Circumferential right lower extremity swelling and erythema involving the foot and extending proximally to the distal calf.  No obvious lymphangitis.        LAB RESULTS  Recent Results (from the past 24 hour(s))   Comprehensive Metabolic Panel    Collection Time: 04/21/21 10:20 PM    Specimen: Blood   Result Value Ref Range    Glucose 121 (H) 65 - 99 mg/dL    BUN 20 8 - 23 mg/dL    Creatinine 1.05 0.76 - 1.27 mg/dL    Sodium 130 (L) 136 - 145 mmol/L    Potassium 3.8 3.5 - 5.2 mmol/L    Chloride 94 (L) 98 - 107 mmol/L    CO2 24.5 22.0 - 29.0 mmol/L    Calcium 8.6 8.6 - 10.5 mg/dL    Total Protein 6.9 6.0 - 8.5 g/dL    Albumin 3.60 3.50 - 5.20 g/dL    ALT (SGPT) 34 1 - 41 U/L    AST (SGOT) 35 1 - 40 U/L    Alkaline Phosphatase 60 39 - 117 U/L    Total Bilirubin 0.9 0.0 - 1.2 mg/dL    eGFR Non African Amer 69 >60 mL/min/1.73    Globulin 3.3 gm/dL    A/G Ratio 1.1 g/dL    BUN/Creatinine Ratio 19.0 7.0 - 25.0    Anion Gap 11.5 5.0 - 15.0 mmol/L   Sedimentation  Rate    Collection Time: 04/21/21 10:20 PM    Specimen: Blood   Result Value Ref Range    Sed Rate 48 (H) 0 - 20 mm/hr   C-reactive Protein    Collection Time: 04/21/21 10:20 PM    Specimen: Blood   Result Value Ref Range    C-Reactive Protein 18.18 (H) 0.00 - 0.50 mg/dL   Lactic Acid, Plasma    Collection Time: 04/21/21 10:20 PM    Specimen: Blood   Result Value Ref Range    Lactate 1.0 0.5 - 2.0 mmol/L   Procalcitonin    Collection Time: 04/21/21 10:20 PM    Specimen: Blood   Result Value Ref Range    Procalcitonin 0.28 (H) 0.00 - 0.25 ng/mL   CBC Auto Differential    Collection Time: 04/21/21 10:20 PM    Specimen: Blood   Result Value Ref Range    WBC 13.15 (H) 3.40 - 10.80 10*3/mm3    RBC 4.30 4.14 - 5.80 10*6/mm3    Hemoglobin 14.0 13.0 - 17.7 g/dL    Hematocrit 39.7 37.5 - 51.0 %    MCV 92.3 79.0 - 97.0 fL    MCH 32.6 26.6 - 33.0 pg    MCHC 35.3 31.5 - 35.7 g/dL    RDW 11.4 (L) 12.3 - 15.4 %    RDW-SD 38.0 37.0 - 54.0 fl    MPV 9.1 6.0 - 12.0 fL    Platelets 203 140 - 450 10*3/mm3    Neutrophil % 81.6 (H) 42.7 - 76.0 %    Lymphocyte % 9.0 (L) 19.6 - 45.3 %    Monocyte % 8.7 5.0 - 12.0 %    Eosinophil % 0.0 (L) 0.3 - 6.2 %    Basophil % 0.2 0.0 - 1.5 %    Immature Grans % 0.5 0.0 - 0.5 %    Neutrophils, Absolute 10.74 (H) 1.70 - 7.00 10*3/mm3    Lymphocytes, Absolute 1.19 0.70 - 3.10 10*3/mm3    Monocytes, Absolute 1.14 (H) 0.10 - 0.90 10*3/mm3    Eosinophils, Absolute 0.00 0.00 - 0.40 10*3/mm3    Basophils, Absolute 0.02 0.00 - 0.20 10*3/mm3    Immature Grans, Absolute 0.06 (H) 0.00 - 0.05 10*3/mm3    nRBC 0.0 0.0 - 0.2 /100 WBC       Ordered the above labs and independently reviewed the results.        RADIOLOGY  XR Chest 1 View    Result Date: 4/21/2021  SINGLE VIEW OF THE CHEST  HISTORY: Fever  COMPARISON: None available.  FINDINGS: Heart size is within normal limits. Lungs appear clear. No pneumothorax, pleural effusion, or acute infiltrate is seen.      No acute findings.  This report was finalized on  4/21/2021 10:04 PM by Dr. Parisa Dobbins M.D.        I ordered the above noted radiological studies. Reviewed by me and discussed with radiologist.  See dictation for official radiology interpretation.      PROCEDURES    Procedures      MEDICATIONS GIVEN IN ER    Medications   sodium chloride 0.9 % flush 10 mL (has no administration in time range)   ondansetron (ZOFRAN) injection 4 mg (has no administration in time range)   HYDROmorphone (DILAUDID) injection 0.5 mg (has no administration in time range)   cefTRIAXone (ROCEPHIN) IVPB 1 g (0 g Intravenous Stopped 4/21/21 2301)   vancomycin (VANCOCIN) in iso-osmotic dextrose IVPB 1 g (premix) 200 mL (0 mg Intravenous Stopped 4/22/21 0032)   pantoprazole (PROTONIX) EC tablet 40 mg (40 mg Oral Given 4/22/21 0029)         PROGRESS, DATA ANALYSIS, CONSULTS, AND MEDICAL DECISION MAKING    All labs have been independently reviewed by me.  All radiology studies have been reviewed by me and discussed with radiologist dictating the report.   EKG's independently viewed and interpreted by me.  Discussion below represents my analysis of pertinent findings related to patient's condition, differential diagnosis, treatment plan and final disposition.    DDx includes but is not limited to: Right lower extremity cellulitis, pneumonia, acute UTI, sepsis, bacteremia, other occult infection.  Physical exam is most consistent with a right lower extremity cellulitis.  Given the patient's lab values I suspect some underlying bacteremia.  Blood cultures have been obtained and the patient is being treated with a third-generation IV cephalosporin as well as vancomycin.  Given his past medical history pain immunocompromised as well as the above plan of care will be admission.  We will place a call to his physician at this time.  Please see below for course of care.    ED Course as of Apr 22 0037 Wed Apr 21, 2021 2104 BP: 116/67 [RC]   2104 Temp(!): 101.4 °F (38.6 °C) [RC]   2104 Heart  Rate: 84 [RC]   2104 Resp: 18 [RC]   2104 Room air   SpO2: 95 % [RC]   2215 Chest x-ray shows no acute findings    [RC]   Thu Apr 22, 2021   0033 Discussed the patient's case with Dr. Ross.  To place the patient under his care for further management in a telemetry bed.    [RC]      ED Course User Index  [RC] Kaushal Jordan III, PA       Patient was placed in face mask in first look. Patient was wearing facemask when I entered the room and throughout our encounter. I wore full protective equipment throughout this patient encounter including a face mask, eye shield, gown and gloves. Hand hygiene was performed before donning protective equipment and after removal when leaving the room.      AS OF 00:37 EDT VITALS:    BP - 101/68  HR - 72  TEMP - 99.7 °F (37.6 °C)  O2 SATS - 96%        DIAGNOSIS  Final diagnoses:   Cellulitis of right lower extremity   Bacteremia   Fever and chills   Immunocompromised patient (CMS/HCC)         DISPOSITION  ADMISSION    Discussed treatment plan and reason for admission with pt/family and admitting physician.  Pt/family voiced understanding of the plan for admission for further testing/treatment as needed.              Kaushal Jordan III, PA  04/22/21 0037

## 2021-04-22 NOTE — ED TRIAGE NOTES
.Pt masked on arrival, staff masked    Pt from home via ems, went [place] hunting this weekend and swelling/pain to rt foot started after, febrile noted on arrival

## 2021-04-22 NOTE — PLAN OF CARE
Problem: Adult Inpatient Plan of Care  Goal: Plan of Care Review  4/22/2021 1601 by Kacey Samuels, RN  Flowsheets (Taken 4/22/2021 1601)  Outcome Summary: VSS, urinal within reach, independent , IV ABX   Goal Outcome Evaluation:        Outcome Summary: VSS, urinal within reach, independent , IV ABX

## 2021-04-22 NOTE — PROGRESS NOTES
"Pharmacokinetic Consult - Vancomycin Dosing (Initial Note)    Jhonatan Norman has been consulted for pharmacy to dose vancomycin for SSTI.  Pharmacy dosing vancomycin per Dr Ross's request.   Goal trough: 15-20 mg/L   Other antimicrobials: none    Relevant clinical data and objective history reviewed:  75 y.o. male 190.5 cm (75\") 85.3 kg (188 lb)    Past Medical History:   Diagnosis Date    Alcoholism (CMS/HCC)     HISTORY OF 20 YRS AGO    GERD (gastroesophageal reflux disease)     Hernia 15+ years ago    Surgically repaired    Low back pain     Nasal obstruction     CHRONIC    Spinal stenosis      Creatinine   Date Value Ref Range Status   04/21/2021 1.05 0.76 - 1.27 mg/dL Final   07/22/2020 1.00 0.60 - 1.30 mg/dL Final     Comment:     Serial Number: [id number]:  [id number]   02/14/2019 1.12 0.76 - 1.27 mg/dL Final   01/09/2019 1.12 0.76 - 1.27 mg/dL Final   12/14/2016 1.10 0.60 - 1.30 mg/dL Final     Comment:     Serial Number: [id number]    : [id number]     BUN   Date Value Ref Range Status   04/21/2021 20 8 - 23 mg/dL Final     Estimated Creatinine Clearance: 74.5 mL/min (by C-G formula based on SCr of 1.05 mg/dL).    Lab Results   Component Value Date    WBC 13.15 (H) 04/21/2021     Temp Readings from Last 3 Encounters:   04/22/21 97.3 °F (36.3 °C) (Oral)   10/19/20 97.7 °F (36.5 °C)   08/12/20 97.5 °F (36.4 °C) (Oral)           Assessment/Plan  Will give an additional vancomycin 750 mg IV x one to add onto 1,000 mg dose given earlier in ED for a 20 mg/kg initial dose. Clinical will follow up with continued dosing later this morning.  Will monitor serum creatinine every 24 hours for the first 3 days then at least every 48 hours per dosing recommendations. Pharmacy will continue to follow daily while on vancomycin and adjust as needed.     [place] [place], [facility]    "

## 2021-04-22 NOTE — PROGRESS NOTES
Patient admitted with presumed cellulitis of his lower extremity. Recent imaging outpatient neg for obvious fracture, air/gas. Labs consistent with bacteremia. Will continue with another dose of vanc in am following evaluation. Already received Rocephin and vanc in ED.     FRANCOISE Ross M.D.  [facility]  Internal Medicine  [phone] (office)  [phone] (mobile)

## 2021-04-22 NOTE — CONSULTS
Referring Provider: Aidan Ross MD      Subjective   History of present illness:    This 75-year-old we are asked for evaluation opinion regarding possible cellulitis.    He is very limited historian on account of just having received some pain medicine.  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  He had been getting along okay until about April 17, 2021 when he developed some right extremity pain.  This progressed to the point where he could not walk.  Was associated with fever to about 101.  Given progression of his symptoms he [facility] by [facility] yesterday and was admitted.  He is received doses of vancomycin and ceftriaxone.  White count was mildly elevated at 13,000 and CRP was grossly elevated at 18      Past medical history: Gastritis, nephrolithiasis, lumbar spinal stenosis status post L5/S1 decompression with laminectomy, history of prostatitis, BPH, cystocele, DJD, IgG deficiency, inguinal hernia repair, cholecystectomy, cataract surgery  No family history of infectious disease  Social history: He is  and lives here and [place], [place].  Retired  of the [employer].  Ex-alcohol    Review of Systems  Pertinent items are noted in HPI, all other systems reviewed and negative    Objective     Physical Exam:   Vital Signs   Temp:  [97.3 °F (36.3 °C)-101.4 °F (38.6 °C)] 97.3 °F (36.3 °C)  Heart Rate:  [59-84] 59  Resp:  [14-20] 14  BP: (101-116)/(63-72) 111/63    GENERAL: Awake and alert, in no acute distress.   HEENT: Oropharynx is clear. Hearing is grossly normal.   EYES: PERRL. No conjunctival injection. No lid lag.   LYMPHATICS: No lymphadenopathy of the neck or inguinal regions.   HEART: Regular rate and regular rhythm. No peripheral edema.   LUNGS: Clear to auscultation anteriorly with normal respiratory effort.   GI: Soft, nontender, nondistended. No appreciable  organomegaly.   SKIN: Warm and dry without cutaneous eruptions I do not really see any cellulitis on the right extremity.  His right ankle seems swollen to me  PSYCHIATRIC: Appropriate mood, affect.  Limited insight, and judgment.     Results Review:  CRP 18.2  White count 13.2  Hemoglobin 14  Platelets 203  Liver function test normal  Creatinine 0.99   Chest x-ray, independently interpreted: No acute pneumonia  Plain films of the ankle show no fracture    Microbiology:  bcx ngtd  COVID 19 neg        Assessment/Plan   1.  Right ankle septic arthritis versus crystalline arthropathy: I really do not see much in the way of cellulitis.  I am concerned that he could have septic arthritis versus crystalline arthropathy.  I have ordered arthrocentesis of the right ankle and we will send for cell count, culture and crystals.  We will keep him on the vancomycin for an AUC of 400-600           Thank you for this consult.  We will continue to follow along and tailor antibiotics as the patient's clinical course evolves.      Jonn Tracy MD  04/22/21  09:33 EDT

## 2021-04-23 LAB
A PHAGOCYTOPH IGG TITR SER IF: NEGATIVE {TITER}
A PHAGOCYTOPH IGM TITR SER IF: NEGATIVE {TITER}
ANION GAP SERPL CALCULATED.3IONS-SCNC: 8.1 MMOL/L (ref 5–15)
B BURGDOR IGG+IGM SER-ACNC: <0.91 ISR (ref 0–0.9)
B BURGDOR IGM SER IA-ACNC: <0.8 INDEX (ref 0–0.79)
BUN SERPL-MCNC: 14 MG/DL (ref 8–23)
BUN/CREAT SERPL: 15.9 (ref 7–25)
CALCIUM SPEC-SCNC: 8.7 MG/DL (ref 8.6–10.5)
CHLORIDE SERPL-SCNC: 104 MMOL/L (ref 98–107)
CO2 SERPL-SCNC: 25.9 MMOL/L (ref 22–29)
CREAT SERPL-MCNC: 0.88 MG/DL (ref 0.76–1.27)
E CHAFFEENSIS IGG TITR SER IF: NEGATIVE {TITER}
E CHAFFEENSIS IGM TITR SER IF: NEGATIVE {TITER}
GFR SERPL CREATININE-BSD FRML MDRD: 85 ML/MIN/1.73
GLUCOSE SERPL-MCNC: 109 MG/DL (ref 65–99)
POTASSIUM SERPL-SCNC: 4.4 MMOL/L (ref 3.5–5.2)
SODIUM SERPL-SCNC: 138 MMOL/L (ref 136–145)
VANCOMYCIN TROUGH SERPL-MCNC: 4.7 MCG/ML (ref 5–20)

## 2021-04-23 PROCEDURE — 25010000002 ENOXAPARIN PER 10 MG: Performed by: INTERNAL MEDICINE

## 2021-04-23 PROCEDURE — 80202 ASSAY OF VANCOMYCIN: CPT | Performed by: INTERNAL MEDICINE

## 2021-04-23 PROCEDURE — 25010000002 VANCOMYCIN 750 MG RECONSTITUTED SOLUTION: Performed by: INTERNAL MEDICINE

## 2021-04-23 PROCEDURE — 80048 BASIC METABOLIC PNL TOTAL CA: CPT | Performed by: INTERNAL MEDICINE

## 2021-04-23 PROCEDURE — 99232 SBSQ HOSP IP/OBS MODERATE 35: CPT | Performed by: INTERNAL MEDICINE

## 2021-04-23 RX ORDER — COLCHICINE 0.6 MG/1
0.6 TABLET ORAL ONCE
Status: COMPLETED | OUTPATIENT
Start: 2021-04-23 | End: 2021-04-24

## 2021-04-23 RX ORDER — COLCHICINE 0.6 MG/1
1.2 TABLET ORAL ONCE
Status: COMPLETED | OUTPATIENT
Start: 2021-04-23 | End: 2021-04-23

## 2021-04-23 RX ADMIN — ENOXAPARIN SODIUM 40 MG: 40 INJECTION SUBCUTANEOUS at 11:15

## 2021-04-23 RX ADMIN — BISACODYL 10 MG: 10 SUPPOSITORY RECTAL at 11:15

## 2021-04-23 RX ADMIN — DOCUSATE SODIUM 50MG AND SENNOSIDES 8.6MG 2 TABLET: 8.6; 5 TABLET, FILM COATED ORAL at 20:27

## 2021-04-23 RX ADMIN — SODIUM CHLORIDE, PRESERVATIVE FREE 10 ML: 5 INJECTION INTRAVENOUS at 20:55

## 2021-04-23 RX ADMIN — Medication 5 MG: at 20:27

## 2021-04-23 RX ADMIN — SODIUM CHLORIDE 750 MG: 900 INJECTION, SOLUTION INTRAVENOUS at 11:15

## 2021-04-23 RX ADMIN — ACETAMINOPHEN 650 MG: 325 TABLET, FILM COATED ORAL at 20:27

## 2021-04-23 RX ADMIN — DOCUSATE SODIUM 50MG AND SENNOSIDES 8.6MG 2 TABLET: 8.6; 5 TABLET, FILM COATED ORAL at 11:15

## 2021-04-23 RX ADMIN — COLCHICINE 1.2 MG: 0.6 TABLET, FILM COATED ORAL at 16:36

## 2021-04-23 NOTE — ED PROVIDER NOTES
MD ATTESTATION NOTE    The NIC and I have discussed this patient's history, physical exam, and treatment plan.  I have reviewed the documentation and personally had a face to face interaction with the patient. I affirm the documentation and agree with the treatment and plan.  The attached note describes my personal findings.    75-year-old gentleman who is chronically immunocompromised presents with fever and cellulitis in the right lower extremity.  He does not appear toxic on exam, but he clearly has cellulitis and would benefit from IV antibiotics and admission secondary to his immunocompromise status.     Bryan Veras MD  04/23/21 1027

## 2021-04-23 NOTE — PROGRESS NOTES
LOS: 1 day     Chief Complaint: Right ankle and foot pain    Interval History: Afebrile, status post arthrocentesis yesterday.  Patient states his right foot is doing better with decreased swelling and pain.  Denies any abdominal pain nausea vomiting or diarrhea    Vital Signs  Temp:  [97.7 °F (36.5 °C)-98.3 °F (36.8 °C)] 97.9 °F (36.6 °C)  Heart Rate:  [60-71] 63  Resp:  [16-18] 18  BP: (109-122)/(60-75) 112/64    Physical Exam:  General: In no acute distress  Cardiovascular: RRR, no lower extremity edema  Respiratory: Breathing comfortably on room air  GI: Soft, NT/ND, + bowel sounds bilaterally  Skin: No rashes   Extremities: Ankle with decreased swelling and erythema    Antibiotics:  Vancomycin dosing per pharmacy     Results Review:    Lab Results   Component Value Date    WBC 13.15 (H) 04/21/2021    HGB 14.0 04/21/2021    HCT 39.7 04/21/2021    MCV 92.3 04/21/2021     04/21/2021     Lab Results   Component Value Date    GLUCOSE 104 (H) 04/22/2021    BUN 17 04/22/2021    CREATININE 0.99 04/22/2021    EGFRIFNONA 74 04/22/2021    BCR 17.2 04/22/2021    CO2 22.3 04/22/2021    CALCIUM 8.7 04/22/2021    ALBUMIN 3.60 04/21/2021    AST 35 04/21/2021    ALT 34 04/21/2021     Arthrocentesis 1395 nucleated cells 83% neutrophils 1645 red blood cells intracellular crystals observed    Microbiology:  4/22 right ankle arthrocentesis cultures NGTD  4/21 COVID neg  4/21 BCx NGTD x 2    Assessment/Plan   Fever  Right ankle swelling and pain arthrocentesis consistent with pseudogout  Leukocytosis    Arthrocentesis results not consistent with septic arthritis.  Cultures are negative to date.  Intracellular crystals are observed consistent with pseudogout.  Will discontinue vancomycin.  Treatment of pseudogout per primary team.    ID will sign off.  Please do not hesitate to call us with further questions or concerns

## 2021-04-23 NOTE — CASE MANAGEMENT/SOCIAL WORK
Discharge Planning Assessment  [facility]     Patient Name: Jhonatan Norman  MRN: [id number]  Today's Date: 4/23/2021    Admit Date: 4/21/2021    Discharge Needs Assessment     Row Name 04/23/21 1425       Resource/Environmental Concerns    Transportation Concerns  car, none       Discharge Needs Assessment    Concerns to be Addressed  no discharge needs identified    Row Name 04/23/21 1424       Living Environment    Lives With  spouse    Current Living Arrangements  home/apartment/condo    Primary Care Provided by  self    Family Caregiver if Needed  spouse    Family Caregiver Names  Zofia Norman        Discharge Plan     Row Name 04/23/21 1426       Plan    Plan  Home with spouse denies any discharge needs    Plan Comments  Spoke with patient at bedside, face sheet verified. Patient lives in a 2 story house with a stair lift. Patient stated he is independent with ADL's. Patient plans to return home with spouse and denies any discharge needs. Patient stated if he needs home health he would like to use [facility]. Peyton Oreilly RN        Continued Care and Services - Admitted Since 4/21/2021    Coordination has not been started for this encounter.         Demographic Summary     Row Name 04/23/21 1424       General Information    Arrived From  emergency department    Reason for Consult  discharge planning    Preferred Language  English    Row Name 04/23/21 1422       General Information    Admission Type  inpatient    Arrived From  emergency department    Referral Source  admission list    Reason for Consult  discharge planning    Preferred Language  English     Used During This Interaction  no        Functional Status     Row Name 04/23/21 1423       Functional Status    Usual Activity Tolerance  good    Current Activity Tolerance  good       Functional Status, IADL    Medications  independent    Meal Preparation  independent    Housekeeping  independent    Laundry  independent    Shopping   independent        Psychosocial    No documentation.       Abuse/Neglect    No documentation.       Legal    No documentation.       Substance Abuse    No documentation.       Patient Forms    No documentation.           Peyton Oreilly RN

## 2021-04-23 NOTE — PROGRESS NOTES
"CHIEF COMPLAINT  Less pain in right foot and ankle, complains of constipation    HISTORY OF PRESENT ILLNESS     Patient was admitted to the hospital with increasing acute pain in the right ankle and foot, low-grade fever, and signs of possible bacteremia based on laboratory values and physical examination.  Arthrocentesis of the right ankle was completed yesterday, most consistent with pseudogout and not septic joint.  Patient had been placed on vancomycin and received 1 dose of Rocephin upon presentation.  Infectious disease group suggested discontinuing vancomycin follow results of the arthrocentesis.  Patient reports that his pain is not worsening, but still remains in quite a bit of pain.  He did not tolerate oral narcotics due to confusion.    ROS  11 point review of systems obtained, with pertinent positives and negatives listed in HPI and chief complaint.  Remainder of systems negative.      PHYSICAL EXAM    Vital Signs:  /64 (BP Location: Right arm, Patient Position: Lying)   Pulse 63   Temp 97.9 °F (36.6 °C) (Oral)   Resp 18   Ht 190.5 cm (75\")   Wt 85.3 kg (188 lb)   SpO2 98%   BMI 23.50 kg/m²       Musculoskeletal: Right ankle and dorsum of right foot warm to touch (less than yesterday), slightly erythematous, slightly tender with limited range of motion due to pain.  There is no open wounds noted.  No palpable cords in right lower extremity.  Negative Homans' sign.    ASSESSMENT      1. Pseudogout.   2. Soft tissue infection    PLAN    1. D/c vanc  2. Colchicine 1.2 mg times 1, repeat today if necessary  3. If able to demostrate stability of gait on right foot, may d/c in net 24 hours    " [Follow-up Visit ___] : a follow-up visit  for [unfilled]

## 2021-04-23 NOTE — PLAN OF CARE
Goal Outcome Evaluation:  Plan of Care Reviewed With: patient  Progress: improving  Outcome Summary: Patient AA&Ox4 but anxious, vital signs stable, pt on room air, NSR per telemonitor.  Pt c/o right foot pain and back pain, PRN tylenol administered.  Pt up to shower tonight with 1 assist.  No distress noted, pt resting quietly at this time.  Will continue to  monitor.   postnasal drip, rhinorrhea, sinus pressure, tinnitus and trouble swallowing. Eyes: Negative. Respiratory: Negative. Cardiovascular: Negative. Gastrointestinal: Negative. Endocrine: Negative. Genitourinary: Negative. Musculoskeletal: . Negative for back pain, gait problem, joint swelling, neck pain and neck stiffness. Skin:. Negative for color change and pallor. Allergic/Immunologic: Negative. Neurological: Negative. Psychiatric/Behavioral: Negative. All other systems reviewed and are negative. Past Medical History:   Diagnosis Date    Crohn's disease ([facility]     IBD (inflammatory bowel disease)     PCOS (polycystic ovarian syndrome)     Psoriasis     Psoriatic arthritis ([facility]     sees Rheumatology       Past Surgical History:   Procedure Laterality Date     SECTION  2006     SECTION, CLASSIC  2009     Family History   Problem Relation Age of Onset    Diabetes Mother     Cancer Mother     Stroke Father      Social History     Socioeconomic History    Marital status:      Spouse name: None    Number of children: None    Years of education: None    Highest education level: None   Occupational History    None   Social Needs    Financial resource strain: None    Food insecurity     Worry: None     Inability: None    Transportation needs     Medical: None     Non-medical: None   Tobacco Use    Smoking status: Former Smoker     Packs/day: 1.00     Years: 10.00     Pack years: 10.00     Last attempt to quit: 2005     Years since quittin.5    Smokeless tobacco: Never Used   Substance and Sexual Activity    Alcohol use: Yes     Comment: ocassionally    Drug use: No    Sexual activity: Yes     Partners: Male     Birth control/protection: I.U.D.    Lifestyle    Physical activity     Days per week: None     Minutes per session: None    Stress: None   Relationships    Social connections     Talks on phone: None     Gets together: None 4. Crohn's disease without complication, unspecified gastrointestinal tract location [facility])           Plan:      See orders,   To check blood glucose and call me with the reading  I added Amaryl  May need a nutrition consult

## 2021-04-24 VITALS
BODY MASS INDEX: 23.38 KG/M2 | TEMPERATURE: 97 F | HEART RATE: 61 BPM | DIASTOLIC BLOOD PRESSURE: 64 MMHG | RESPIRATION RATE: 16 BRPM | SYSTOLIC BLOOD PRESSURE: 107 MMHG | HEIGHT: 75 IN | WEIGHT: 188 LBS | OXYGEN SATURATION: 99 %

## 2021-04-24 PROBLEM — M11.20 PSEUDOGOUT: Status: ACTIVE | Noted: 2021-04-24

## 2021-04-24 LAB
ANION GAP SERPL CALCULATED.3IONS-SCNC: 10 MMOL/L (ref 5–15)
BUN SERPL-MCNC: 14 MG/DL (ref 8–23)
BUN/CREAT SERPL: 17.3 (ref 7–25)
CALCIUM SPEC-SCNC: 8.9 MG/DL (ref 8.6–10.5)
CHLORIDE SERPL-SCNC: 107 MMOL/L (ref 98–107)
CO2 SERPL-SCNC: 25 MMOL/L (ref 22–29)
CREAT SERPL-MCNC: 0.81 MG/DL (ref 0.76–1.27)
GFR SERPL CREATININE-BSD FRML MDRD: 93 ML/MIN/1.73
GLUCOSE SERPL-MCNC: 103 MG/DL (ref 65–99)
POTASSIUM SERPL-SCNC: 3.8 MMOL/L (ref 3.5–5.2)
R RICKETTSI IGM SER-ACNC: 0.31 INDEX (ref 0–0.89)
SODIUM SERPL-SCNC: 142 MMOL/L (ref 136–145)

## 2021-04-24 PROCEDURE — 80048 BASIC METABOLIC PNL TOTAL CA: CPT | Performed by: INTERNAL MEDICINE

## 2021-04-24 RX ORDER — COLCHICINE 0.6 MG/1
0.6 TABLET ORAL ONCE
Status: COMPLETED | OUTPATIENT
Start: 2021-04-24 | End: 2021-04-24

## 2021-04-24 RX ORDER — COLCHICINE 0.6 MG/1
0.6 TABLET ORAL ONCE
Qty: 1 TABLET | Refills: 0 | Status: SHIPPED | OUTPATIENT
Start: 2021-04-24 | End: 2021-04-25

## 2021-04-24 RX ADMIN — COLCHICINE 0.6 MG: 0.6 TABLET, FILM COATED ORAL at 12:36

## 2021-04-24 RX ADMIN — DOCUSATE SODIUM 50MG AND SENNOSIDES 8.6MG 2 TABLET: 8.6; 5 TABLET, FILM COATED ORAL at 12:36

## 2021-04-24 RX ADMIN — SODIUM CHLORIDE, PRESERVATIVE FREE 10 ML: 5 INJECTION INTRAVENOUS at 09:00

## 2021-04-24 RX ADMIN — COLCHICINE 0.6 MG: 0.6 TABLET, FILM COATED ORAL at 00:29

## 2021-04-24 NOTE — PLAN OF CARE
"  Problem: Adult Inpatient Plan of Care  Goal: Plan of Care Review  Outcome: Ongoing, Progressing  Flowsheets (Taken 4/24/2021 0037)  Progress: no change  Outcome Summary: VITALS AS DOCUMENTED. RESTING QUIETLY WITH EYES CLOSED, NO S/SX OF DISTRESS. CHRONIC BACK PAIN, NUMBNESS AND TINGLING TO BLE NOT RELIEVED WITH PREV BACK SX / CHRONIC. WEAKNESS TO LLE ALSO CHRONIC AND \"A RESULT OF THE NERVE DAMAGE FROM BACK\" RIGHT FOOT AND ANKLE REMAIN SWOLLEN AND TENDER WITH REDNESS NOTED. MONITORING.  Goal: Patient-Specific Goal (Individualized)  Outcome: Ongoing, Progressing  Goal: Absence of Hospital-Acquired Illness or Injury  Outcome: Ongoing, Progressing  Intervention: Identify and Manage Fall Risk  Description: Perform standard risk assessment with a validated tool or comprehensive approach appropriate to the patient on admission; reassess fall risk frequently, with change in status or transfer to another level of care.  Communicate fall injury risk to interprofessional healthcare team.  Determine need for increased observation, equipment and environmental modification, such as low bed and signage, as well as supportive, nonskid footwear.  Adjust safety measures to individual developmental age, stage and identified risk factors.  Reinforce the importance of safety and physical activity with patient and family.  Perform regular intentional rounding to assess need for position change, pain assessment, personal needs, including assistance with toileting.  Recent Flowsheet Documentation  Taken 4/24/2021 0031 by Latonia Ashton, RN  Safety Promotion/Fall Prevention:   activity supervised   assistive device/personal items within reach   clutter free environment maintained   fall prevention program maintained   lighting adjusted   nonskid shoes/slippers when out of bed   room organization consistent   safety round/check completed  Taken 4/23/2021 2200 by Latonia Ashton, RN  Safety Promotion/Fall Prevention: safety round/check " completed  Taken 4/23/2021 2030 by Latonia Ashton RN  Safety Promotion/Fall Prevention:   activity supervised   assistive device/personal items within reach   clutter free environment maintained   fall prevention program maintained   lighting adjusted   nonskid shoes/slippers when out of bed   room organization consistent   safety round/check completed  Intervention: Prevent Skin Injury  Description: Assess skin risk on admission and at regular intervals throughout hospital stay.  Keep all areas of skin (especially folds) clean and dry.  Maintain adequate skin hydration.  Relieve and redistribute pressure and protect bony prominences; implement measures based on patient-specific risk factors.  Match turning and repositioning schedule to clinical condition.  Encourage weight shift frequently; assist with reposition if unable to complete independently.  Float heels off bed. Avoid pressure on the Achilles tendon.  Keep skin free from extended contact with medical devices.  Use aids (e.g., slide boards, mechanical lift) during transfer.  Recent Flowsheet Documentation  Taken 4/24/2021 0031 by Latonia Ashton RN  Body Position:   neutral head position   neutral body alignment   weight shift assistance provided  Skin Protection: adhesive use limited  Taken 4/23/2021 2200 by Latonia Ashton RN  Body Position:   position maintained   neutral body alignment   neutral head position   supine  Taken 4/23/2021 2030 by Latonia Ashton RN  Body Position:   position changed independently   neutral body alignment   neutral head position   supine  Skin Protection: adhesive use limited  Intervention: Prevent and Manage VTE (venous thromboembolism) Risk  Description: Assess for VTE risk.  Encourage/assist with early ambulation.  Initiate and maintain compression or other therapy, as indicated based on identified risk in accordance with organizational protocol/provider order.  Encourage both active and passive leg exercises while in  bed, if unable to ambulate.  Recent Flowsheet Documentation  Taken 4/23/2021 2030 by Latonia Ashton RN  VTE Prevention/Management:   bilateral   dorsiflexion/plantar flexion performed  Intervention: Prevent Infection  Description: Maintain skin and mucous membrane integrity; promote hand, oral and pulmonary hygiene.  Optimize fluid balance, nutrition, sleep and glycemic control to maximize infection resistance.  Identify potential sources of infection early to prevent or mitigate progression of infection (e.g., wound, lines, devices).  Evaluate ongoing need for invasive devices; remove promptly when no longer indicated.  Recent Flowsheet Documentation  Taken 4/24/2021 0031 by Latonia Ashton RN  Infection Prevention:   equipment surfaces disinfected   hand hygiene promoted   personal protective equipment utilized   rest/sleep promoted   single patient room provided   visitors restricted/screened  Taken 4/23/2021 2200 by Latonia Ashton RN  Infection Prevention:   equipment surfaces disinfected   hand hygiene promoted   personal protective equipment utilized   rest/sleep promoted   single patient room provided   visitors restricted/screened  Taken 4/23/2021 2030 by Latonia Ashton RN  Infection Prevention:   equipment surfaces disinfected   hand hygiene promoted   personal protective equipment utilized   rest/sleep promoted   single patient room provided   visitors restricted/screened  Goal: Optimal Comfort and Wellbeing  Outcome: Ongoing, Progressing  Intervention: Provide Person-Centered Care  Description: Use a family-focused approach to care.  Develop trust and rapport by proactively providing information, encouraging questions, addressing concerns and offering reassurance.  Acknowledge emotional response to hospitalization.  Recognize and utilize personal coping strategies.  Honor spiritual and cultural preferences.  Recent Flowsheet Documentation  Taken 4/24/2021 0031 by Latonia Ashton, DEBI  Trust  Relationship/Rapport:   care explained   choices provided   emotional support provided   empathic listening provided   questions answered   questions encouraged   reassurance provided   thoughts/feelings acknowledged  Taken 4/23/2021 2030 by Latonia Ashton RN  Trust Relationship/Rapport:   care explained   choices provided   emotional support provided   empathic listening provided   questions answered   questions encouraged   reassurance provided   thoughts/feelings acknowledged  Goal: Readiness for Transition of Care  Outcome: Ongoing, Progressing     Problem: Asthma Comorbidity  Goal: Maintenance of Asthma Control  Outcome: Ongoing, Progressing  Intervention: Maintain Asthma Symptom Control  Description: Evaluate adherence to management plan (e.g., medication, trigger avoidance, symptom control, self-monitoring).  Advocate for continuation of home regimen, including medication, method of delivery, schedule and symptom monitoring; acknowledge preferred modality and routine.  Minimize exposure to potential triggers, such as perfume, cleaning chemicals and all types of smoke.  Assess for proper use of inhaled medication and delivery technique; assist or reinstruct if needed.  Evaluate effectiveness of coping skills; encourage expression of feelings, expectations and concerns related to disease management and quality of life; reinforce education to enhance management plan and wellbeing.  Recent Flowsheet Documentation  Taken 4/24/2021 0031 by Latonia Ashton, RN  Medication Review/Management: medications reviewed     Problem: COPD Comorbidity  Goal: Maintenance of COPD Symptom Control  Outcome: Ongoing, Progressing  Intervention: Maintain COPD-Symptom Control  Description: Evaluate adherence to management plan (e.g., medication, trigger avoidance, infection prevention, self-monitoring).  Advocate for continuation of home regimen, including medication, method of delivery, schedule and symptom monitoring.  Anticipate  the need for breathing techniques and activity pacing to minimize fatigue and breathlessness.  Assess for proper use of inhaled medication and delivery technique; assist or reinstruct if needed.  Evaluate effectiveness of coping skills; encourage expression of feelings, expectations and concerns related to disease management and quality of life; reinforce education to enhance health management plan and wellbeing.  Recent Flowsheet Documentation  Taken 4/24/2021 0031 by Latonia Ashton RN  Medication Review/Management: medications reviewed     Problem: Diabetes Comorbidity  Goal: Blood Glucose Level Within Desired Range  Outcome: Ongoing, Progressing     Problem: Heart Failure Comorbidity  Goal: Maintenance of Heart Failure Symptom Control  Outcome: Ongoing, Progressing  Intervention: Maintain Heart Failure-Management Strategies  Description: Evaluate adherence to home heart failure self-care regimen (e.g., medication, fluid balance, sodium intake, daily weight, physical activity, telemonitoring, support).  Advocate continuation of home medication and schedule.  Consider pharmacologic therapy administration time and effects (e.g., avoid giving diuretic prior to bedtime or nitrates on empty stomach).  Monitor response to pharmacologic therapy (e.g., weight fluctuations, blood pressure, electrolyte level).  Monitor for signs and symptoms of anxiety and depression, including severity and duration; if present, provide psychosocial support.  Consider need for palliative care consult.  Recent Flowsheet Documentation  Taken 4/24/2021 0031 by Latonia Ashton RN  Medication Review/Management: medications reviewed     Problem: Hypertension Comorbidity  Goal: Blood Pressure in Desired Range  Outcome: Ongoing, Progressing  Intervention: Maintain Hypertension-Management Strategies  Description: Evaluate adherence to home antihypertensive regimen (e.g., exercise and activity, diet modification, medication).  Provide scheduled  antihypertensive medication; consider administration time and effects (e.g., avoid giving diuretic prior to bedtime).  Monitor response to medication therapy (e.g., blood pressure, electrolyte level, medication effects).  Minimize risk of orthostatic hypotension; encourage caution with position changes, particularly if elderly  Recent Flowsheet Documentation  Taken 4/24/2021 0031 by Latonia Ashton, RN  Medication Review/Management: medications reviewed     Problem: Obstructive Sleep Apnea Risk or Actual (Comorbidity Management)  Goal: Unobstructed Breathing During Sleep  Outcome: Ongoing, Progressing     Problem: Pain Chronic (Persistent) (Comorbidity Management)  Goal: Acceptable Pain Control and Functional Ability  Outcome: Ongoing, Progressing  Intervention: Develop Pain Management Plan  Description: Acknowledge patient as the expert in pain self-management.  Use a consistent, validated tool for pain assessment.  Set pain management goals; determine acceptable level of discomfort to allow for maximal functioning and quality of life.  Determine [id number] pain management plan, including both pharmacologic and nonpharmacologic measures.  Identify and integrate past successful treatment measures, if able.  Encourage patient and caregiver involvement in pain assessment, interventions and safety measures.  Reevaluate plan regularly.  Recent Flowsheet Documentation  Taken 4/23/2021 2030 by Latonia Ashton, RN  Pain Management Interventions:   care clustered   quiet environment facilitated   see MAR   pillow support provided   position adjusted   relaxation techniques promoted  Intervention: Manage Persistent Pain  Description: Evaluate pain level, effect of treatment and patient response at regular intervals.  Note: Response to pain may diminish over time. This does not indicate that pain is absent.  Minimize pain stimuli; coordinate care and adjust environment (e.g., light, noise, unnecessary movement);  promote sleep/rest.  Match pharmacologic analgesia to severity and type of pain mechanism (e.g., neuropathic, muscle, inflammatory); consider multimodal approach (e.g., nonopioid, opioid, adjuvant).  Provide medication at regular intervals; titrate to patient response.  Manage breakthrough pain with additional doses; consider rotation or switching medication.  Monitor for signs of substance tolerance (increased dose to reach desired effect, decreased effect with same dose).  Avoid abrupt withdrawal of medication, especially agents capable of causing physical dependence.  Manage medication-induced effects, such as constipation, nausea, urinary retention, somnolence and dizziness.  Consider nonpharmacologic pain interventions to improve function (e.g., massage, transcutaneous electrical nerve stimulation, vibration, heat or cold application, immobilization, hydrotherapy).  Consider addition of complementary or alternative therapy, such as acupuncture, hypnosis or therapeutic touch.  Recent Flowsheet Documentation  Taken 4/24/2021 0031 by Latonia Ashton RN  Medication Review/Management: medications reviewed  Taken 4/23/2021 2030 by Latonia Ashton, RN  Bowel Elimination Promotion: commode/bedpan at bedside  Sleep/Rest Enhancement:   awakenings minimized   consistent schedule promoted   noise level reduced   regular sleep/rest pattern promoted   relaxation techniques promoted   room darkened  Intervention: Optimize Psychosocial Wellbeing  Description: Facilitate patient’s self-control over pain by providing pain information and allowing choices in treatment.  Consider and address emotional response to pain.  Explore and promote use of coping strategies; address barriers to successful coping.  Evaluate and assist with psychosocial, cultural and spiritual factors impacting pain.  Modify pain perception by using cognitive-behavioral techniques, such as distraction, guided imagery, meditation, relaxation or music.  Recent  Flowsheet Documentation  Taken 4/24/2021 0031 by Latonia Ashton, RN  Supportive Measures:   active listening utilized   positive reinforcement provided  Taken 4/23/2021 2030 by Latonia Ashton RN  Supportive Measures:   active listening utilized   positive reinforcement provided   decision-making supported  Family/Support System Care: support provided     Problem: Seizure Disorder Comorbidity  Goal: Maintenance of Seizure Control  Outcome: Ongoing, Progressing  Intervention: Maintain Seizure-Symptom Control  Description: Identify risks that may lower seizure threshold (e.g., hypoglycemia, illness, change in medications); assist in treatment.  Evaluate adherence to management plan (e.g., medication, symptom-control, trigger-avoidance, self-monitoring).  Advocate for continuation of home regimen, including medication, medication administration schedule, diet, sleep schedule and symptom monitoring; acknowledge home management and routine.  Minimize potential seizure triggers, such as stress, sleep deprivation and diet modifications.  Evaluate effectiveness of coping skills; encourage expression of feelings, expectations and concerns related to disease management and quality of life; reinforce education to enhance health management plan and patient wellbeing.  Maintain seizure precautions, such as removal of potentially harmful objects, use of padded side rails, placement of bed in low position, as well as suction and airway protection equipment readily available.  Provide a calm, quiet environment.  If seizure occurs, stay at bedside; monitor and record details of seizure activity.  Maintain patent airway; do not insert anything into the mouth (unless the airway becomes compromised).  Place head of bed flat and turn to side-lying position to prevent aspiration and promote safety; do not restrain.  Anticipate administration of pharmacologic therapy such as anticonvulsant.  Assess for injury following seizure; monitor,  reassure and reorient patient.  Recent Flowsheet Documentation  Taken 4/24/2021 0031 by Latonia Ashton RN  Seizure Precautions:   activity supervised   clutter-free environment maintained  Taken 4/23/2021 2030 by Latonia Ashton RN  Seizure Precautions:   clutter-free environment maintained   activity supervised     Problem: Skin Injury Risk Increased  Goal: Skin Health and Integrity  Outcome: Ongoing, Progressing  Intervention: Optimize Skin Protection  Description: Reassess skin injury risk and inspect skin frequently (e.g., scheduled interval, with turning, with change in condition) to provide optimal prevention.  Maintain adequate tissue perfusion (e.g., encourage fluid balance, avoid crossing legs, constrictive clothing or devices) to promote tissue oxygenation.  Maintain head of bed at lowest degree of elevation tolerated, considering medical condition and other restrictions. Limit amount of time head of bed is elevated greater than 30 degrees to prevent friction/shear injury.  Avoid positioning onto an area that remains reddened.  Minimize incontinence and moisture (e.g., toileting schedule; moisture-wicking pad, diaper or incontinence collection device, skin moisture barrier).  Cleanse skin promptly and gently when soiled utilizing a pH-balanced cleanser.  Relieve and redistribute pressure (e.g., schedule position changes; utilize higher specification foam mattress, chair cushion, constant low-pressure or alternating-pressure support surface; medical device repositioning; protective dressing applicatio  Support increased progressive functional activity (e.g., therapeutic exercise) to decrease risk associated with immobility. Balance rest with activity.  Recent Flowsheet Documentation  Taken 4/24/2021 0031 by Latonia Ashton RN  Head of Bed (HOB): HOB lowered  Skin Protection: adhesive use limited  Taken 4/23/2021 2200 by Latonia Ashton RN  Head of Bed (HOB): HOB at 20 degrees  Taken 4/23/2021 2030 by  Latonia Ashton, RN  Head of Bed (HOB): HOB at 20 degrees  Skin Protection: adhesive use limited     Problem: Fall Injury Risk  Goal: Absence of Fall and Fall-Related Injury  Outcome: Ongoing, Progressing  Intervention: Identify and Manage Contributors to Fall Injury Risk  Description: Reassess fall risk frequently and with change in status or transfer to another level of care.  Communicate fall injury risk to all healthcare team members (e.g., rounds, change of shift/provider, patient transport).  Anticipate needs; perform regular intentional rounding to assess need for position change, pain assessment, personal needs (e.g., toileting) and placement of necessary items.  Provide reorientation, appropriate sensory stimulation and routines with changes in mental status to decrease risk of fall.  Promote use of personal vision and auditory aids (e.g., glasses, hearing aids).  Assess assistance level required for safe and effective care; provide support as needed (e.g., toileting, bathing, mobilization).  Define behavior and activity limits to patient and family.  If fall occurs, assess for and treat injury; determine cause; revise fall injury prevention plan.  Regularly review medication contribution to fall risk; adjust medication administration times to minimize risk of falling.  Consider risk related to polypharmacy and age.  Balance adequate pain management with potential for oversedation.  Recent Flowsheet Documentation  Taken 4/24/2021 0031 by Latonia Ashton, RN  Medication Review/Management: medications reviewed  Intervention: Promote Injury-Free Environment  Description: Provide a safe, barrier-free environment that encourages independent activity.  Keep care area uncluttered and well-lighted.  Determine need for increased observation or auditory alerts (e.g., bed or chair alarm).  Assess equipment and environmental modification needs (e.g., low bed, signage, nonskid footwear, grab bars).  Avoid use of  "restraints.  Recent Flowsheet Documentation  Taken 4/24/2021 0031 by Latonia Ashton, RN  Safety Promotion/Fall Prevention:   activity supervised   assistive device/personal items within reach   clutter free environment maintained   fall prevention program maintained   lighting adjusted   nonskid shoes/slippers when out of bed   room organization consistent   safety round/check completed  Taken 4/23/2021 2200 by Latonia Ashton, RN  Safety Promotion/Fall Prevention: safety round/check completed  Taken 4/23/2021 2030 by Latonia Ashton, RN  Safety Promotion/Fall Prevention:   activity supervised   assistive device/personal items within reach   clutter free environment maintained   fall prevention program maintained   lighting adjusted   nonskid shoes/slippers when out of bed   room organization consistent   safety round/check completed   Goal Outcome Evaluation:     Progress: no change  Outcome Summary: VITALS AS DOCUMENTED. RESTING QUIETLY WITH EYES CLOSED, NO S/SX OF DISTRESS. CHRONIC BACK PAIN, NUMBNESS AND TINGLING TO BLE NOT RELIEVED WITH PREV BACK SX / CHRONIC. WEAKNESS TO LLE ALSO CHRONIC AND \"A RESULT OF THE NERVE DAMAGE FROM BACK\" RIGHT FOOT AND ANKLE REMAIN SWOLLEN AND TENDER WITH REDNESS NOTED. MONITORING.  "

## 2021-04-24 NOTE — DISCHARGE SUMMARY
[facility] DISCHARGE SUMMARY    DATE OF ADMISSION: April 21, 2021  DATE OF DISCHARGE: April 24, 2021    DISCHARGE DIAGNOSIS:     1. Acute monoarticular exacerbation of pseudogout  2. Soft tissue infection right foot and ankle    DISCHARGE MEDICATIONS:    1. Colchicine 0.6 mg through April 26, 2021  2. Resume home medications as dictated in H&P    DISCHARGE DIET:    Regular diet    DISCHARGE CONDITION:    Stable    DISCHARGE INSTRUCTIONS:    1. Colchicine 0.6 mg as needed for exacerbation of pseudogout    HISTORY OF PRESENT ILLNESS:    Please see HPI from dictated summary dated April 21, 2021    HOSPITAL COURSE:    Patient was started empirically on vancomycin and received 1 dose of Rocephin in the emergency room for what appeared to be acute soft tissue infection of the right ankle and foot.  Patient's procalcitonin was mildly elevated and his white blood cell count was elevated as well.  His CRP was 18.  Infectious disease consultation was obtained and they recommended continuing vancomycin for possible septic joint and a joint aspiration.  The cell count was indicative of a nonbacterial source.  There were no bacteria seen on the Gram stain or culture.  His blood cultures were negative.  Crystals were noted on the aspiration consistent with pseudogout.  The patient was given 1.2 mg of colchicine followed by another 0.6 mg dose yesterday, April 23, 2021.  His vancomycin was discontinued.  The morning of this dictation and discharge, the pain in his foot has improved significantly.  The erythema and swelling have also improved.  He continues to have some low back pain which is a chronic issue for him, recently worsened by his inability to ambulate.  He has successfully taken steroids in the past for that but at this time does not feel that is necessary.  He is advised to finish out his colchicine over the next 2 days and is instructed to take on an as-needed basis for exacerbation.      IMAGING DONE WHILE  IN THE HOSPITAL    XR Foot 3+ View Right    Result Date: 4/20/2021  XR FOOT 3+ VW RIGHT-  INDICATIONS: Pain  TECHNIQUE: 3 views of the right foot  COMPARISON: None available  FINDINGS:  No acute fracture, erosion, or dislocation is identified. Small calcaneal spurring. Soft tissues appear unremarkable. Follow-up/further evaluation can be obtained as indications persist.       As described.    This report was finalized on 4/20/2021 12:55 PM by Dr. Nilton Langford M.D.      XR Chest 1 View    Result Date: 4/21/2021  SINGLE VIEW OF THE CHEST  HISTORY: Fever  COMPARISON: None available.  FINDINGS: Heart size is within normal limits. Lungs appear clear. No pneumothorax, pleural effusion, or acute infiltrate is seen.      No acute findings.  This report was finalized on 4/21/2021 10:04 PM by Dr. Parisa Dobbins M.D.      FL Guided Aspiration Joint    Result Date: 4/23/2021  RIGHT ANKLE JOINT ASPIRATION  INDICATION: Right ankle pain and swelling, possible gout  Total fluoroscopy time 1 minute and 13 seconds. 2 fluoroscopic images taken  TECHNIQUE: The dorsalis pedis artery was palpated and marked. Following sterile prep and administration of lidocaine for local anesthesia, a 22-gauge needle was advanced into the right ankle joint under fluoroscopic guidance. About 5 mL of yellow-colored joint fluid was sent for requested labs. Patient tolerated procedure well without immediate complications      Successful fluoroscopically guided right ankle joint aspiration  This report was finalized on 4/23/2021 7:14 AM by AKSHAT Verdugo M.D.  [facility]  Internal Medicine  [phone] (office)  [phone] (mobile)

## 2021-04-26 LAB
B MICROTI IGG TITR SER: NORMAL {TITER}
B MICROTI IGM TITR SER: NORMAL {TITER}
BACTERIA SPEC AEROBE CULT: NORMAL
R RICKETTSI IGG SER QL IA: POSITIVE
R RICKETTSI IGG TITR SER IF: NORMAL {TITER}

## 2021-04-27 LAB
BACTERIA FLD CULT: NORMAL
BACTERIA SPEC AEROBE CULT: NORMAL
GRAM STN SPEC: NORMAL
GRAM STN SPEC: NORMAL

## 2021-08-16 ENCOUNTER — OFFICE VISIT (OUTPATIENT)
Dept: NEUROSURGERY | Facility: CLINIC | Age: 76
End: 2021-08-16

## 2021-08-16 DIAGNOSIS — M48.062 SPINAL STENOSIS OF LUMBAR REGION WITH NEUROGENIC CLAUDICATION: ICD-10-CM

## 2021-08-16 DIAGNOSIS — M43.16 SPONDYLOLISTHESIS AT L4-L5 LEVEL: Primary | ICD-10-CM

## 2021-08-16 DIAGNOSIS — M62.81 CALF MUSCLE WEAKNESS: ICD-10-CM

## 2021-08-16 PROCEDURE — 99441 PR PHYS/QHP TELEPHONE EVALUATION 5-10 MIN: CPT | Performed by: NEUROLOGICAL SURGERY

## 2021-08-16 RX ORDER — AZELASTINE 1 MG/ML
SPRAY, METERED NASAL
COMMUNITY
Start: 2021-07-30

## 2021-08-16 NOTE — PROGRESS NOTES
Subjective   Patient ID: Jhonatan Norman is a 75 y.o. male is here today for follow-up. He was last seen 2/15/21 for Spondylolisthesis at L4-L5, spinal stenosis of lumbar region with neurogenic claudication, and calf muscle weakness.  This was a tele visit.    You have chosen to receive care through a telephone visit. Do you consent to use a telephone visit for your medical care today? yes    Today patient reports he is level with his pain. He still has his numbness in his neck based on his activity.  His feet are numb, left more than right.  He has weakness.    This was a televisit to my office lasting 8 minutes.  The patient was at home.      This patient has a known spinal stenosis and spondylolisthesis at L4-L5.  He has had chronic left calf weakness for years.  Has been about the same.  He has been trying to work out at home and he feels still functional although his left calf the course is weak.  But he does not have any sciatic pain.  We will keep an eye on him and have him come in in 8 months for a face-to-face visit.    History of Present Illness    The following portions of the patient's history were reviewed and updated as appropriate: allergies, current medications, past family history, past medical history, past social history, past surgical history and problem list.    Review of Systems        Objective     There were no vitals filed for this visit.  There is no height or weight on file to calculate BMI.      Physical Exam   Deferred  Neurologic Exam   Deferred      Assessment/Plan   Independent Review of Radiographic Studies:      I personally reviewed the images from the following studies.    I reviewed the lumbar MRI and plain x-rays done on 7/22/2020.  Of note he does have a transitional segment.  The spondylolisthesis is at L4-L5 and he is very stenotic at that level and the level below at L5-S1.  There has not really been any dramatic change since 2016.  Agree with the report.    Medical Decision  Making:      He stable right now but will keep in mind him and have him come in to see me in 8 months as a face-to-face visit.      Diagnoses and all orders for this visit:    1. Spondylolisthesis at L4-L5 level (Primary)    2. Spinal stenosis of lumbar region with neurogenic claudication    3. Calf muscle weakness      Return in about 8 months (around 4/16/2022) for Patient to face.

## 2021-09-30 ENCOUNTER — TELEPHONE (OUTPATIENT)
Dept: NEUROSURGERY | Facility: CLINIC | Age: 76
End: 2021-09-30

## 2021-09-30 NOTE — TELEPHONE ENCOUNTER
Patient is scheduled          ----- Message from Jhonatan Norman sent at 9/30/2021 11:29 AM EDT -----  Regarding: Visit Follow-Up Question  Contact: [phone]  I had a phone visit with Dr. MONTEIRO on August 16 and he indicated an in-office follow up would be scheduled for 8 months later.  I assumed someone would advise a date and knowing he’s a little busy thought I should go ahead and get something scheduled so this is a reminder.  Thanks, Sarthak Norman

## 2021-10-12 ENCOUNTER — OFFICE VISIT (OUTPATIENT)
Dept: GASTROENTEROLOGY | Facility: CLINIC | Age: 76
End: 2021-10-12

## 2021-10-12 VITALS — HEIGHT: 75 IN | TEMPERATURE: 96.8 F | BODY MASS INDEX: 23.45 KG/M2 | WEIGHT: 188.6 LBS

## 2021-10-12 DIAGNOSIS — K21.9 GASTROESOPHAGEAL REFLUX DISEASE, UNSPECIFIED WHETHER ESOPHAGITIS PRESENT: Primary | ICD-10-CM

## 2021-10-12 DIAGNOSIS — R10.9 ABDOMINAL PAIN, UNSPECIFIED ABDOMINAL LOCATION: ICD-10-CM

## 2021-10-12 PROCEDURE — 99204 OFFICE O/P NEW MOD 45 MIN: CPT | Performed by: INTERNAL MEDICINE

## 2021-10-12 RX ORDER — FAMOTIDINE 20 MG/1
20 TABLET, FILM COATED ORAL AS NEEDED
COMMUNITY

## 2021-10-12 RX ORDER — CEFUROXIME AXETIL 250 MG/1
TABLET ORAL
Status: ON HOLD | COMMUNITY
Start: 2021-10-08 | End: 2021-12-22

## 2021-10-12 RX ORDER — SODIUM CHLORIDE, SODIUM LACTATE, POTASSIUM CHLORIDE, CALCIUM CHLORIDE 600; 310; 30; 20 MG/100ML; MG/100ML; MG/100ML; MG/100ML
30 INJECTION, SOLUTION INTRAVENOUS CONTINUOUS
Status: CANCELLED | OUTPATIENT
Start: 2021-11-12

## 2021-10-12 RX ORDER — FLUTICASONE PROPIONATE 50 MCG
SPRAY, SUSPENSION (ML) NASAL
COMMUNITY
Start: 2021-10-08

## 2021-10-12 NOTE — PROGRESS NOTES
Chief Complaint   Patient presents with   • Heartburn   • Abdominal Pain        Jhonatan Norman is a  75 y.o. male here for an initial visit for GERD, abdominal pain    HPI 75-year-old white male patient of Dr. Aidan Ross with history of reflux and epigastric as well as periumbilical pain on an intermittent basis.  He has undergone previous upper GI evaluation dating back to January 2018 at which time he was noted to have a pyloroplasty.  He had undergone peptic ulcer surgery with a BNP in the past.  Biopsies did show some mild chronic gastritis and esophagitis.  He had been treated before with proton pump inhibitors but had difficulty taking Prevacid because of its side effects.  He also switched to Nexium but this caused constipation.  He has been on histamine blocker therapy as well.  He also recalls using Carafate in the past.  Given the chronicity of his symptoms I encouraged him to consider repeat endoscopy at this time.  He is amenable to that.  He has had previous colonoscopy dating back to 2013 and would be due for follow-up colonoscopy for screening purposes in 2023.    Past Medical History:   Diagnosis Date   • Alcoholism (HCC)     HISTORY OF 20 YRS AGO   • GERD (gastroesophageal reflux disease)    • Hernia 15+ years ago    Surgically repaired   • Low back pain    • Nasal obstruction     CHRONIC   • Spinal stenosis        Current Outpatient Medications   Medication Sig Dispense Refill   • azelastine (ASTELIN) 0.1 % nasal spray      • cefuroxime (CEFTIN) 250 MG tablet      • Cholecalciferol (VITAMIN D3) 5000 units capsule capsule Take 5,000 Units by mouth Daily.     • famotidine (PEPCID) 20 MG tablet Take 20 mg by mouth As Needed for Heartburn.     • fluticasone (FLONASE) 50 MCG/ACT nasal spray      • Immune Globulin, Human,-klhw (Xembify) 1 GM/5ML solution Inject  under the skin into the appropriate area as directed.       No current facility-administered medications for this visit.       PRN  Meds:.    No Known Allergies    Social History     Socioeconomic History   • Marital status:    • Years of education: college graduate   Tobacco Use   • Smoking status: Former Smoker     Packs/day: 2.00     Years: 10.00     Pack years: 20.00     Types: Cigarettes     Start date:      Quit date:      Years since quittin.7   • Smokeless tobacco: Never Used   Substance and Sexual Activity   • Alcohol use: No     Comment: No alcohol for 20 years     • Drug use: No   • Sexual activity: Defer       Family History   Problem Relation Age of Onset   • Cancer Other    • Ulcers Son    • Irritable bowel syndrome Daughter    • Diverticulitis Daughter    • Alcohol abuse Father    • Malig Hyperthermia Neg Hx        Review of Systems   Constitutional: Negative for activity change, appetite change, fatigue and unexpected weight change.   HENT: Negative for congestion, facial swelling, sore throat, trouble swallowing and voice change.    Eyes: Negative for photophobia and visual disturbance.   Respiratory: Negative for cough and choking.    Cardiovascular: Negative for chest pain.   Gastrointestinal: Positive for abdominal pain. Negative for abdominal distention, anal bleeding, blood in stool, constipation, diarrhea, nausea, rectal pain and vomiting.        GERD   Endocrine: Negative for polyphagia.   Musculoskeletal: Negative for arthralgias, gait problem and joint swelling.   Skin: Negative for color change, pallor and rash.   Allergic/Immunologic: Negative for food allergies.   Neurological: Negative for speech difficulty and headaches.   Hematological: Does not bruise/bleed easily.   Psychiatric/Behavioral: Negative for agitation, confusion and sleep disturbance.       Vitals:    10/12/21 1558   Temp: 96.8 °F (36 °C)       Physical Exam  Constitutional:       Appearance: He is well-developed.   HENT:      Head: Normocephalic.   Eyes:      Conjunctiva/sclera: Conjunctivae normal.   Cardiovascular:      Rate and  Rhythm: Normal rate and regular rhythm.   Pulmonary:      Breath sounds: Normal breath sounds.   Abdominal:      General: Bowel sounds are normal.      Palpations: Abdomen is soft.   Musculoskeletal:         General: Normal range of motion.      Cervical back: Normal range of motion.   Skin:     General: Skin is warm and dry.   Neurological:      Mental Status: He is alert and oriented to person, place, and time.   Psychiatric:         Behavior: Behavior normal.         ASSESSMENT   #1 GERD: Chronic issue with intermittent exacerbation, multiple treatments have been applied but with variable success secondary to side effects.  #2 abdominal pain: Consistent with gastric source but cannot rule out the possibility of a small bowel or even colonic etiology as well.  #3 history of peptic ulcer disease with previous vagotomy and pyloroplasty      PLAN  Schedule EGD  Consider alternative PPI pending endoscopic findings  Follow-up colonoscopy in 2023 or sooner if conditions warrant.      ICD-10-CM ICD-9-CM   1. Gastroesophageal reflux disease, unspecified whether esophagitis present  K21.9 530.81   2. Abdominal pain, unspecified abdominal location  R10.9 789.00

## 2021-11-10 ENCOUNTER — LAB (OUTPATIENT)
Dept: LAB | Facility: HOSPITAL | Age: 76
End: 2021-11-10

## 2021-11-10 PROCEDURE — U0005 INFEC AGEN DETEC AMPLI PROBE: HCPCS | Performed by: INTERNAL MEDICINE

## 2021-11-10 PROCEDURE — U0004 COV-19 TEST NON-CDC HGH THRU: HCPCS | Performed by: INTERNAL MEDICINE

## 2021-11-11 ENCOUNTER — TELEPHONE (OUTPATIENT)
Dept: GASTROENTEROLOGY | Facility: CLINIC | Age: 76
End: 2021-11-11

## 2021-11-11 NOTE — TELEPHONE ENCOUNTER
MARYLIN patient via telephone for egd.  Rescheduled on 12/22/21 with arrival time of  2:30pm due to illness. Prep packet mailed to verified address. Patient also advised that his/her arrival time assigned based on [facility] guidelines . MARYLIN Williamson.

## 2021-11-11 NOTE — TELEPHONE ENCOUNTER
Pt states he has a sinus infection and wants to know if he should reschedule his procedure for tomorrow or if he's ok to come. Please give pt a call at [phone].

## 2021-12-03 ENCOUNTER — TELEPHONE (OUTPATIENT)
Dept: GASTROENTEROLOGY | Facility: CLINIC | Age: 76
End: 2021-12-03

## 2021-12-03 NOTE — TELEPHONE ENCOUNTER
----- Message from Jhonatan Norman sent at 12/3/2021  9:52 AM EST -----  Regarding: COVID TESTING  I HAD TO RESCHEDULE AN ‘EGD’ ORIGINALLY SCHEDULED FOR  NOV. 12 DUE TO UPPER RESPIRATORY ISSUE AND IT IS NOW RESCHEDULED FOR 12/22.  THE FIRST APPOINTMENT HAD A COVID TEST REQUIREMENT BUT NO MENTION WAS MADE OF THIS FOR THE DEC. 22 TEST.  IS A COVID TEST REQUIRED?  THANKS

## 2021-12-14 ENCOUNTER — TRANSCRIBE ORDERS (OUTPATIENT)
Dept: GASTROENTEROLOGY | Facility: CLINIC | Age: 76
End: 2021-12-14

## 2021-12-14 DIAGNOSIS — Z01.818 OTHER SPECIFIED PRE-OPERATIVE EXAMINATION: Primary | ICD-10-CM

## 2021-12-17 ENCOUNTER — TELEPHONE (OUTPATIENT)
Dept: GASTROENTEROLOGY | Facility: CLINIC | Age: 76
End: 2021-12-17

## 2021-12-17 DIAGNOSIS — Z01.812 ENCOUNTER FOR PREOPERATIVE SCREENING LABORATORY TESTING FOR COVID-19 VIRUS: Primary | ICD-10-CM

## 2021-12-17 DIAGNOSIS — Z20.822 ENCOUNTER FOR PREOPERATIVE SCREENING LABORATORY TESTING FOR COVID-19 VIRUS: Primary | ICD-10-CM

## 2021-12-17 NOTE — TELEPHONE ENCOUNTER
----- Message from Gretchen Willson CMA sent at 12/17/2021  9:00 AM EST -----  Regarding: Covid orders  Please place order for pre- procedure covid test  Thank you

## 2021-12-21 ENCOUNTER — LAB (OUTPATIENT)
Dept: LAB | Facility: HOSPITAL | Age: 76
End: 2021-12-21

## 2021-12-21 DIAGNOSIS — Z01.818 OTHER SPECIFIED PRE-OPERATIVE EXAMINATION: ICD-10-CM

## 2021-12-21 LAB — SARS-COV-2 ORF1AB RESP QL NAA+PROBE: NOT DETECTED

## 2021-12-21 PROCEDURE — U0005 INFEC AGEN DETEC AMPLI PROBE: HCPCS

## 2021-12-21 PROCEDURE — U0004 COV-19 TEST NON-CDC HGH THRU: HCPCS

## 2021-12-21 PROCEDURE — C9803 HOPD COVID-19 SPEC COLLECT: HCPCS

## 2021-12-22 ENCOUNTER — ANESTHESIA EVENT (OUTPATIENT)
Dept: GASTROENTEROLOGY | Facility: HOSPITAL | Age: 76
End: 2021-12-22

## 2021-12-22 ENCOUNTER — ANESTHESIA (OUTPATIENT)
Dept: GASTROENTEROLOGY | Facility: HOSPITAL | Age: 76
End: 2021-12-22

## 2021-12-22 ENCOUNTER — HOSPITAL ENCOUNTER (OUTPATIENT)
Facility: HOSPITAL | Age: 76
Setting detail: HOSPITAL OUTPATIENT SURGERY
Discharge: HOME | End: 2021-12-22
Attending: INTERNAL MEDICINE | Admitting: INTERNAL MEDICINE

## 2021-12-22 VITALS
DIASTOLIC BLOOD PRESSURE: 74 MMHG | BODY MASS INDEX: 24.38 KG/M2 | WEIGHT: 190 LBS | HEART RATE: 55 BPM | RESPIRATION RATE: 16 BRPM | HEIGHT: 74 IN | SYSTOLIC BLOOD PRESSURE: 108 MMHG | OXYGEN SATURATION: 96 %

## 2021-12-22 DIAGNOSIS — R10.9 ABDOMINAL PAIN, UNSPECIFIED ABDOMINAL LOCATION: ICD-10-CM

## 2021-12-22 DIAGNOSIS — K21.9 GASTROESOPHAGEAL REFLUX DISEASE, UNSPECIFIED WHETHER ESOPHAGITIS PRESENT: ICD-10-CM

## 2021-12-22 PROCEDURE — 88305 TISSUE EXAM BY PATHOLOGIST: CPT | Performed by: INTERNAL MEDICINE

## 2021-12-22 PROCEDURE — S0260 H&P FOR SURGERY: HCPCS | Performed by: INTERNAL MEDICINE

## 2021-12-22 PROCEDURE — 25010000002 PROPOFOL 10 MG/ML EMULSION: Performed by: ANESTHESIOLOGY

## 2021-12-22 PROCEDURE — 43239 EGD BIOPSY SINGLE/MULTIPLE: CPT | Performed by: INTERNAL MEDICINE

## 2021-12-22 PROCEDURE — 87081 CULTURE SCREEN ONLY: CPT | Performed by: INTERNAL MEDICINE

## 2021-12-22 RX ORDER — SODIUM CHLORIDE 0.9 % (FLUSH) 0.9 %
3 SYRINGE (ML) INJECTION EVERY 12 HOURS SCHEDULED
Status: DISCONTINUED | OUTPATIENT
Start: 2021-12-22 | End: 2021-12-22 | Stop reason: HOSPADM

## 2021-12-22 RX ORDER — LIDOCAINE HYDROCHLORIDE 20 MG/ML
INJECTION, SOLUTION INFILTRATION; PERINEURAL AS NEEDED
Status: DISCONTINUED | OUTPATIENT
Start: 2021-12-22 | End: 2021-12-22 | Stop reason: SURG

## 2021-12-22 RX ORDER — SODIUM CHLORIDE 0.9 % (FLUSH) 0.9 %
10 SYRINGE (ML) INJECTION AS NEEDED
Status: DISCONTINUED | OUTPATIENT
Start: 2021-12-22 | End: 2021-12-22 | Stop reason: HOSPADM

## 2021-12-22 RX ORDER — PROPOFOL 10 MG/ML
VIAL (ML) INTRAVENOUS AS NEEDED
Status: DISCONTINUED | OUTPATIENT
Start: 2021-12-22 | End: 2021-12-22 | Stop reason: SURG

## 2021-12-22 RX ORDER — SODIUM CHLORIDE, SODIUM LACTATE, POTASSIUM CHLORIDE, CALCIUM CHLORIDE 600; 310; 30; 20 MG/100ML; MG/100ML; MG/100ML; MG/100ML
30 INJECTION, SOLUTION INTRAVENOUS CONTINUOUS PRN
Status: DISCONTINUED | OUTPATIENT
Start: 2021-12-22 | End: 2021-12-22 | Stop reason: HOSPADM

## 2021-12-22 RX ORDER — SODIUM CHLORIDE, SODIUM LACTATE, POTASSIUM CHLORIDE, CALCIUM CHLORIDE 600; 310; 30; 20 MG/100ML; MG/100ML; MG/100ML; MG/100ML
30 INJECTION, SOLUTION INTRAVENOUS CONTINUOUS
Status: DISCONTINUED | OUTPATIENT
Start: 2021-12-22 | End: 2021-12-22 | Stop reason: HOSPADM

## 2021-12-22 RX ADMIN — LIDOCAINE HYDROCHLORIDE 120 MG: 20 INJECTION, SOLUTION INFILTRATION; PERINEURAL at 15:55

## 2021-12-22 RX ADMIN — PROPOFOL 260 MG: 10 INJECTION, EMULSION INTRAVENOUS at 15:57

## 2021-12-22 RX ADMIN — SODIUM CHLORIDE, POTASSIUM CHLORIDE, SODIUM LACTATE AND CALCIUM CHLORIDE 30 ML/HR: 600; 310; 30; 20 INJECTION, SOLUTION INTRAVENOUS at 15:17

## 2021-12-22 NOTE — H&P
[facility]  Pre Procedure History & Physical    Chief Complaint:   GERD, periumbilical pain    Subjective     HPI:   This 76-year-old male presents the endoscopy suite for upper endoscopic evaluation.  He has issues with reflux as well as periumbilical pain.    Past Medical History:   Past Medical History:   Diagnosis Date   • Alcoholism (HCC)     HISTORY OF 20 YRS AGO   • GERD (gastroesophageal reflux disease)    • Hernia 15+ years ago    Surgically repaired   • Low back pain    • Nasal obstruction     CHRONIC   • Spinal stenosis        Past Surgical History:  Past Surgical History:   Procedure Laterality Date   • ABDOMINAL SURGERY  1975    vagotomy    pylorplasty   • BACK SURGERY      lumbar decompression   • CHOLECYSTECTOMY     • COLONOSCOPY  02/28/2013    fair prep, tics, stool, torts, IH   • ENDOSCOPIC FUNCTIONAL SINUS SURGERY (FESS) N/A 2/20/2019    Procedure: ENDOSCOPIC FUNCTIONAL SINUS SURGERY;  Surgeon: Kareem Weaver MD;  Location: [facility] OR [facility] [place];  Service: ENT   • ENDOSCOPY  02/28/2013    Z line irregular, 45 cm from incisors, torts, HH, bilious gastric fluiod, gastritis, pylorplasty found, Mora's, reactive gastropathychronic inflammation, esophagitis,    • ENDOSCOPY N/A 1/17/2018    Z-line irregular, 43 cm from the incisors, gastritis, a pyroloplasty was found, characterized by healthy appearing mucosa, normal duodenal bulb and second portion of the duodenum   • EPIDURAL BLOCK     • EYE SURGERY      CATARACTS   • HERNIA REPAIR Right     inguinal hernia   • SEPTOPLASTY Bilateral 2/20/2019    Procedure: NASAL SEPTOPLASTY BILATERAL INFERIOR TURBINECTOMY;  Surgeon: Kareem Weaver MD;  Location: [facility] OR [facility] [place];  Service: ENT   • UPPER GASTROINTESTINAL ENDOSCOPY  2 weeks ago   • VAGOTOMY AND PYLOROPLASTY         Family History:  Family History   Problem Relation Age of Onset   • Cancer Other    • Ulcers Son    • Irritable bowel syndrome Daughter    • Diverticulitis Daughter     • Alcohol abuse Father    • Malig Hyperthermia Neg Hx        Social History:   reports that he quit smoking about 24 years ago. His smoking use included cigarettes. He started smoking about 62 years ago. He has a 20.00 pack-year smoking history. He has never used smokeless tobacco. He reports that he does not drink alcohol and does not use drugs.    Medications:   No medications prior to admission.       Allergies:  Patient has no known allergies.    ROS:    Pertinent items are noted in HPI, all other systems reviewed and negative     Objective     There were no vitals taken for this visit.    Physical Exam   Constitutional: Pt is oriented to person, place, and time and well-developed, well-nourished, and in no distress.   Mouth/Throat: Oropharynx is clear and moist.   Neck: Normal range of motion.   Cardiovascular: Normal rate, regular rhythm and normal heart sounds.    Pulmonary/Chest: Effort normal and breath sounds normal.   Abdominal: Soft. Nontender  Skin: Skin is warm and dry.   Psychiatric: Mood, memory, affect and judgment normal.     Assessment/Plan     Diagnosis:  GERD  Periumbilical pain    Anticipated Surgical Procedure:  EGD    The risks, benefits, and alternatives of this procedure have been discussed with the patient or the responsible party- the patient understands and agrees to proceed.

## 2021-12-22 NOTE — ANESTHESIA PREPROCEDURE EVALUATION
Anesthesia Evaluation     Patient summary reviewed and Nursing notes reviewed                Airway   Dental      Pulmonary    (+) a smoker Former,   Cardiovascular         Neuro/Psych  GI/Hepatic/Renal/Endo    (+)  GERD,      Musculoskeletal     Abdominal    Substance History      OB/GYN          Other   arthritis,                        Anesthesia Plan    ASA 3     MAC       Anesthetic plan, all risks, benefits, and alternatives have been provided, discussed and informed consent has been obtained with: patient.

## 2021-12-22 NOTE — ANESTHESIA POSTPROCEDURE EVALUATION
"Patient: Jhonatan Norman    Procedure Summary     Date: 12/22/21 Room / Location:  [facility]    Anesthesia Start: 1551 Anesthesia Stop: 1610    Procedure: ESOPHAGOGASTRODUODENOSCOPY with biopsies (cold bx) (N/A Esophagus) Diagnosis:       Gastroesophageal reflux disease, unspecified whether esophagitis present      Abdominal pain, unspecified abdominal location      (Gastroesophageal reflux disease, unspecified whether esophagitis present [K21.9])      (Abdominal pain, unspecified abdominal location [R10.9])    Surgeons: Jhonatan Powell MD Provider: Calos Salazar MD    Anesthesia Type: MAC ASA Status: 3          Anesthesia Type: MAC    Vitals  No vitals data found for the desired time range.          Post Anesthesia Care and Evaluation    Patient location during evaluation: bedside  Patient participation: complete - patient participated  Level of consciousness: awake and alert  Pain management: adequate  Airway patency: patent  Anesthetic complications: No anesthetic complications    Cardiovascular status: acceptable  Respiratory status: acceptable  Hydration status: acceptable    Comments: /84 (BP Location: Left arm, Patient Position: Lying)   Pulse 60   Resp 16   Ht 188 cm (74\")   Wt 86.2 kg (190 lb)   SpO2 98%   BMI 24.39 kg/m²       "

## 2021-12-24 LAB
LAB AP CASE REPORT: NORMAL
PATH REPORT.FINAL DX SPEC: NORMAL
PATH REPORT.GROSS SPEC: NORMAL
UREASE TISS QL: NEGATIVE

## 2022-01-06 ENCOUNTER — TELEPHONE (OUTPATIENT)
Dept: GASTROENTEROLOGY | Facility: CLINIC | Age: 77
End: 2022-01-06

## 2022-01-06 NOTE — TELEPHONE ENCOUNTER
Call to pt.  Advise per path report that bx's benign.      Advise per Dr Powell notes.  Verb understanding.     Denies mid abd pain at this time - will monitor and call back if needed.     States occasionally uses gaviscon for heartburn.  Asking if any concerns for using this.  Question to DR Powell.

## 2022-01-06 NOTE — TELEPHONE ENCOUNTER
----- Message from Jhonatan MONTEIRO MD sent at 1/3/2022  5:02 PM EST -----  Regarding: Biopsy results  Okay to call results, continue H2 blocker.  If mid abdominal pain persist can consider small bowel follow-through to further assess.  ----- Message -----  From: Lab, Background User  Sent: 12/24/2021   6:44 AM EST  To: Jhonatan MONTEIRO MD

## 2022-01-06 NOTE — TELEPHONE ENCOUNTER
----- Message from Jhonatan MONTEIRO MD sent at 1/3/2022  5:00 PM EST -----  Regarding: Biopsy results  Okay to continue H2 blocker.  If mid abdominal pain persist consider small bowel follow-through to further assess.  ----- Message -----  From: Lab, Background User  Sent: 12/24/2021   6:44 AM EST  To: Jhonatan MONTEIRO MD

## 2022-04-04 NOTE — PROGRESS NOTES
Subjective   Patient ID: Jhonatan Norman is a 76 y.o. male is here today for an 8 month follow up of spondylolisthesis at L4/5.   Pt last seen on 8/16/22 and reported numbness in his neck based on his activity.  His feet are numb, left more than right.  He has weakness.    Today, Mr. Norman reports ome back pain. He also reports weakness in bilateral legs; left greater than right. He does reports mid back pain occasionally. He does have occasional neck soreness.     I been following this patient for several years.  He has chronic left calf weakness and off-and-on low back pain and off-and-on sciatica from spinal stenosis and spondylolisthesis.  He has been self quarantined for quite some time because of the pandemic.  He is getting a little bit more accustomed to getting out and I encouraged him to try and get back to the gym to work on strengthening.  He fell recently and had some neck pain that was transient but overall he is doing well.  He has to take care of his wife who has longstanding chronic health problems and so he has remained cautious in terms of his potential exposure to Covid.  But his strength is the same.  No signs of myelopathy.  I will continue to see him and I asked him to come and see me in 1 year.      Back Pain  The problem has been gradually improving since onset. The pain is present in the lumbar spine and thoracic spine. The quality of the pain is described as aching. The symptoms are aggravated by sitting. Associated symptoms include weakness. Pertinent negatives include no bladder incontinence, bowel incontinence, chest pain, fever, numbness or tingling.       The following portions of the patient's history were reviewed and updated as appropriate: allergies, current medications, past family history, past medical history, past social history, past surgical history and problem list.    Review of Systems   Constitutional: Negative for fever.   Respiratory: Negative for chest tightness and  shortness of breath.    Cardiovascular: Negative for chest pain.   Gastrointestinal: Negative for bowel incontinence.   Genitourinary: Negative for bladder incontinence.   Musculoskeletal: Positive for back pain.   Neurological: Positive for weakness. Negative for tingling and numbness.   All other systems reviewed and are negative.      Objective   Physical Exam  Constitutional:       Appearance: He is well-developed.   HENT:      Head: Normocephalic and atraumatic.   Eyes:      Extraocular Movements: EOM normal.      Conjunctiva/sclera: Conjunctivae normal.      Pupils: Pupils are equal, round, and reactive to light.   Neck:      Vascular: No carotid bruit.   Neurological:      Mental Status: He is oriented to person, place, and time.      Coordination: Finger-Nose-Finger Test and Heel to Shin Test normal.      Gait: Gait is intact.      Deep Tendon Reflexes:      Reflex Scores:       Tricep reflexes are 2+ on the right side and 2+ on the left side.       Bicep reflexes are 2+ on the right side and 2+ on the left side.       Brachioradialis reflexes are 2+ on the right side and 2+ on the left side.       Patellar reflexes are 2+ on the right side and 2+ on the left side.       Achilles reflexes are 2+ on the right side and 2+ on the left side.  Psychiatric:         Speech: Speech normal.       Neurologic Exam     Mental Status   Oriented to person, place, and time.   Registration of memory: Good recent and remote memory.   Attention: normal. Concentration: normal.   Speech: speech is normal   Level of consciousness: alert  Knowledge: consistent with education.     Cranial Nerves     CN II   Visual fields full to confrontation.   Visual acuity: normal    CN III, IV, VI   Pupils are equal, round, and reactive to light.  Extraocular motions are normal.     CN V   Facial sensation intact.   Right corneal reflex: normal  Left corneal reflex: normal    CN VII   Facial expression full, symmetric.   Right facial weakness:  none  Left facial weakness: none    CN VIII   Hearing: intact    CN IX, X   Palate: symmetric    CN XI   Right sternocleidomastoid strength: normal  Left sternocleidomastoid strength: normal    CN XII   Tongue: not atrophic  Tongue deviation: none    Motor Exam   Muscle bulk: normal  Right arm tone: normal  Left arm tone: normal  Right leg tone: normal  Left leg tone: normal    Strength   Strength 5/5 except as noted.   Left gastroc: 2/5    Sensory Exam   Light touch normal.     Gait, Coordination, and Reflexes     Gait  Gait: normal    Coordination   Finger to nose coordination: normal  Heel to shin coordination: normal    Reflexes   Right brachioradialis: 2+  Left brachioradialis: 2+  Right biceps: 2+  Left biceps: 2+  Right triceps: 2+  Left triceps: 2+  Right patellar: 2+  Left patellar: 2+  Right achilles: 2+  Left achilles: 2+  Right : 2+  Left : 2+      Assessment/Plan   Independent Review of Radiographic Studies:      I reviewed the lumbar MRI and plain x-rays done on 7/22/2020.  Of note he does have a transitional segment.  The spondylolisthesis is at L4-L5 and he is very stenotic at that level and the level below at L5-S1.  There has not really been any dramatic change since 2016.  Agree with the report.    Medical Decision Making:      Overall clinically stable.  But I will see him in 1 year.  I encouraged him to continue doing his exercises if no other reason then to maintain his strength.      Diagnoses and all orders for this visit:    1. Spondylolisthesis at L4-L5 level (Primary)    2. Calf muscle weakness    3. Spinal stenosis of lumbar region with neurogenic claudication      Return in about 1 year (around 4/13/2023), or face To face.

## 2022-04-13 ENCOUNTER — OFFICE VISIT (OUTPATIENT)
Dept: NEUROSURGERY | Facility: CLINIC | Age: 77
End: 2022-04-13

## 2022-04-13 VITALS
BODY MASS INDEX: 24.38 KG/M2 | SYSTOLIC BLOOD PRESSURE: 106 MMHG | TEMPERATURE: 97.1 F | HEART RATE: 67 BPM | DIASTOLIC BLOOD PRESSURE: 62 MMHG | WEIGHT: 190 LBS | OXYGEN SATURATION: 98 % | HEIGHT: 74 IN

## 2022-04-13 DIAGNOSIS — M43.16 SPONDYLOLISTHESIS AT L4-L5 LEVEL: Primary | ICD-10-CM

## 2022-04-13 DIAGNOSIS — M62.81 CALF MUSCLE WEAKNESS: ICD-10-CM

## 2022-04-13 DIAGNOSIS — M48.062 SPINAL STENOSIS OF LUMBAR REGION WITH NEUROGENIC CLAUDICATION: ICD-10-CM

## 2022-04-13 PROCEDURE — 99213 OFFICE O/P EST LOW 20 MIN: CPT | Performed by: NEUROLOGICAL SURGERY

## 2023-02-02 ENCOUNTER — PRE-PROCEDURE SCREENING (OUTPATIENT)
Dept: GASTROENTEROLOGY | Facility: CLINIC | Age: 78
End: 2023-02-02
Payer: MEDICARE

## 2023-02-02 NOTE — TELEPHONE ENCOUNTER
LAST SCOPE 10YRS ( NO RECORDS) --No personal history of polyps--No family history of polyps or colon cancer--No blood thinners--Medications:            Al Hyd-Mg Tr-Alg Ac-Sod Bicarb (GAVISCON-2 PO)  azelastine (ASTELIN) 0.1 % nasal spray  Cholecalciferol (VITAMIN D3) 5000 units capsule capsule  famotidine (PEPCID) 20 MG tablet    fluticasone (FLONASE) 50 MCG/ACT nasal spray  Immune Globulin, Human,-klhw 1 GM/5ML solution             QUESTIONNAIRE SCEEENING  HAS BEEN SENT TO DOCTOR FOR REVIEW

## 2023-02-06 DIAGNOSIS — Z12.12 SCREENING FOR COLORECTAL CANCER: Primary | ICD-10-CM

## 2023-02-06 DIAGNOSIS — Z12.11 SCREENING FOR COLORECTAL CANCER: Primary | ICD-10-CM

## 2023-02-16 ENCOUNTER — TELEPHONE (OUTPATIENT)
Dept: GASTROENTEROLOGY | Facility: CLINIC | Age: 78
End: 2023-02-16
Payer: MEDICARE

## 2023-02-16 PROBLEM — Z12.12 SCREENING FOR COLORECTAL CANCER: Status: ACTIVE | Noted: 2023-02-16

## 2023-02-16 PROBLEM — Z12.11 SCREENING FOR COLORECTAL CANCER: Status: ACTIVE | Noted: 2023-02-16

## 2023-02-16 NOTE — TELEPHONE ENCOUNTER
MARYLIN Moon for colonoscopy on 5/24/23  arrive at12pm    . Mailed Prep instructions to Mailing address on-file. ----miralax

## 2023-03-23 ENCOUNTER — ANESTHESIA EVENT (OUTPATIENT)
Dept: PERIOP | Facility: HOSPITAL | Age: 78
End: 2023-03-23
Payer: MEDICARE

## 2023-03-23 ENCOUNTER — PREP FOR SURGERY (OUTPATIENT)
Dept: OTHER | Facility: HOSPITAL | Age: 78
End: 2023-03-23
Payer: MEDICARE

## 2023-03-23 DIAGNOSIS — K35.30 ACUTE APPENDICITIS WITH LOCALIZED PERITONITIS, WITHOUT PERFORATION, ABSCESS, OR GANGRENE: Primary | ICD-10-CM

## 2023-03-24 ENCOUNTER — ANESTHESIA (OUTPATIENT)
Dept: PERIOP | Facility: HOSPITAL | Age: 78
End: 2023-03-24
Payer: MEDICARE

## 2023-03-24 ENCOUNTER — HOSPITAL ENCOUNTER (OUTPATIENT)
Facility: HOSPITAL | Age: 78
Setting detail: HOSPITAL OUTPATIENT SURGERY
Discharge: HOME | End: 2023-03-24
Attending: SURGERY | Admitting: SURGERY
Payer: MEDICARE

## 2023-03-24 ENCOUNTER — TELEPHONE (OUTPATIENT)
Dept: SURGERY | Facility: CLINIC | Age: 78
End: 2023-03-24

## 2023-03-24 VITALS
OXYGEN SATURATION: 100 % | DIASTOLIC BLOOD PRESSURE: 70 MMHG | WEIGHT: 184.99 LBS | RESPIRATION RATE: 18 BRPM | HEIGHT: 75 IN | SYSTOLIC BLOOD PRESSURE: 120 MMHG | TEMPERATURE: 97.9 F | BODY MASS INDEX: 23 KG/M2 | HEART RATE: 56 BPM

## 2023-03-24 DIAGNOSIS — K35.30 ACUTE APPENDICITIS WITH LOCALIZED PERITONITIS, WITHOUT PERFORATION, ABSCESS, OR GANGRENE: ICD-10-CM

## 2023-03-24 LAB
ANION GAP SERPL CALCULATED.3IONS-SCNC: 12.1 MMOL/L (ref 5–15)
BUN SERPL-MCNC: 18 MG/DL (ref 8–23)
BUN/CREAT SERPL: 18.9 (ref 7–25)
CALCIUM SPEC-SCNC: 9.9 MG/DL (ref 8.6–10.5)
CHLORIDE SERPL-SCNC: 104 MMOL/L (ref 98–107)
CO2 SERPL-SCNC: 24.9 MMOL/L (ref 22–29)
CREAT SERPL-MCNC: 0.95 MG/DL (ref 0.76–1.27)
DEPRECATED RDW RBC AUTO: 41.3 FL (ref 37–54)
EGFRCR SERPLBLD CKD-EPI 2021: 83 ML/MIN/1.73
ERYTHROCYTE [DISTWIDTH] IN BLOOD BY AUTOMATED COUNT: 12 % (ref 12.3–15.4)
GLUCOSE SERPL-MCNC: 97 MG/DL (ref 65–99)
HCT VFR BLD AUTO: 42.4 % (ref 37.5–51)
HGB BLD-MCNC: 14.4 G/DL (ref 13–17.7)
MCH RBC QN AUTO: 32 PG (ref 26.6–33)
MCHC RBC AUTO-ENTMCNC: 34 G/DL (ref 31.5–35.7)
MCV RBC AUTO: 94.2 FL (ref 79–97)
PLATELET # BLD AUTO: 130 10*3/MM3 (ref 140–450)
PMV BLD AUTO: 9.7 FL (ref 6–12)
POTASSIUM SERPL-SCNC: 3.7 MMOL/L (ref 3.5–5.2)
QT INTERVAL: 447 MS
RBC # BLD AUTO: 4.5 10*6/MM3 (ref 4.14–5.8)
SODIUM SERPL-SCNC: 141 MMOL/L (ref 136–145)
WBC NRBC COR # BLD: 7.01 10*3/MM3 (ref 3.4–10.8)

## 2023-03-24 PROCEDURE — 44970 LAPAROSCOPY APPENDECTOMY: CPT | Performed by: SURGERY

## 2023-03-24 PROCEDURE — 93005 ELECTROCARDIOGRAM TRACING: CPT | Performed by: ANESTHESIOLOGY

## 2023-03-24 PROCEDURE — 80048 BASIC METABOLIC PNL TOTAL CA: CPT | Performed by: ANESTHESIOLOGY

## 2023-03-24 PROCEDURE — 25010000002 PROPOFOL 10 MG/ML EMULSION: Performed by: ANESTHESIOLOGY

## 2023-03-24 PROCEDURE — 25010000002 FENTANYL CITRATE (PF) 50 MCG/ML SOLUTION: Performed by: ANESTHESIOLOGY

## 2023-03-24 PROCEDURE — 25010000002 KETOROLAC TROMETHAMINE PER 15 MG: Performed by: ANESTHESIOLOGY

## 2023-03-24 PROCEDURE — 93010 ELECTROCARDIOGRAM REPORT: CPT | Performed by: INTERNAL MEDICINE

## 2023-03-24 PROCEDURE — 85027 COMPLETE CBC AUTOMATED: CPT | Performed by: SURGERY

## 2023-03-24 PROCEDURE — 25010000002 LEVOFLOXACIN PER 250 MG: Performed by: SURGERY

## 2023-03-24 PROCEDURE — 88304 TISSUE EXAM BY PATHOLOGIST: CPT | Performed by: SURGERY

## 2023-03-24 PROCEDURE — 25010000002 ONDANSETRON PER 1 MG: Performed by: ANESTHESIOLOGY

## 2023-03-24 DEVICE — CLIP LIGAT VASC HORIZON TI MD/LG GRN 6CT: Type: IMPLANTABLE DEVICE | Site: ABDOMEN | Status: FUNCTIONAL

## 2023-03-24 DEVICE — THE ECHELON, ECHELON ENDOPATH™ AND ECHELON FLEX™ FAMILIES OF ENDOSCOPIC LINEAR CUTTERS AND RELOADS ARE STERILE, SINGLE PATIENT USE INSTRUMENTS THAT SIMULTANEOUSLY CUT AND STAPLE TISSUE. THERE ARE SIX STAGGERED ROWS OF STAPLES, THREE ON EITHER SIDE OF THE CUT LINE. THE 45 MM INSTRUMENTS HAVE A STAPLE LINE THATIS APPROXIMATELY 45 MM LONG AND A CUT LINE THAT IS APPROXIMATELY 42 MM LONG. THE SHAFT CAN ROTATE FREELY IN BOTH DIRECTIONS AND AN ARTICULATION MECHANISM ON ARTICULATING INSTRUMENTS ENABLES BENDING THE DISTAL PORTIONOF THE SHAFT TO FACILITATE LATERAL ACCESS OF THE OPERATIVE SITE.THE INSTRUMENTS ARE SHIPPED WITHOUT A RELOAD AND MUST BE LOADED PRIOR TO USE. A STAPLE RETAINING CAP ON THE RELOAD PROTECTS THE STAPLE LEG POINTS DURING SHIPPING AND TRANSPORTATION. THE INSTRUMENTS’ LOCK-OUT FEATURE IS DESIGNED TO PREVENT A USED RELOAD FROM BEING REFIRED.
Type: IMPLANTABLE DEVICE | Site: ABDOMEN | Status: FUNCTIONAL
Brand: ECHELON ENDOPATH

## 2023-03-24 RX ORDER — TRAMADOL HYDROCHLORIDE 50 MG/1
50 TABLET ORAL EVERY 6 HOURS PRN
Qty: 12 TABLET | Refills: 0 | Status: SHIPPED | OUTPATIENT
Start: 2023-03-24 | End: 2023-04-05

## 2023-03-24 RX ORDER — HYDROCODONE BITARTRATE AND ACETAMINOPHEN 5; 325 MG/1; MG/1
1 TABLET ORAL ONCE AS NEEDED
Status: DISCONTINUED | OUTPATIENT
Start: 2023-03-24 | End: 2023-03-24 | Stop reason: HOSPADM

## 2023-03-24 RX ORDER — SODIUM CHLORIDE, SODIUM LACTATE, POTASSIUM CHLORIDE, CALCIUM CHLORIDE 600; 310; 30; 20 MG/100ML; MG/100ML; MG/100ML; MG/100ML
9 INJECTION, SOLUTION INTRAVENOUS CONTINUOUS
Status: DISCONTINUED | OUTPATIENT
Start: 2023-03-24 | End: 2023-03-24 | Stop reason: HOSPADM

## 2023-03-24 RX ORDER — NALOXONE HCL 0.4 MG/ML
0.2 VIAL (ML) INJECTION AS NEEDED
Status: DISCONTINUED | OUTPATIENT
Start: 2023-03-24 | End: 2023-03-24 | Stop reason: HOSPADM

## 2023-03-24 RX ORDER — ACETAMINOPHEN 500 MG
500 TABLET ORAL EVERY 6 HOURS
Qty: 28 TABLET | Refills: 0
Start: 2023-03-24 | End: 2023-03-31

## 2023-03-24 RX ORDER — LIDOCAINE HYDROCHLORIDE 10 MG/ML
0.5 INJECTION, SOLUTION EPIDURAL; INFILTRATION; INTRACAUDAL; PERINEURAL ONCE AS NEEDED
Status: DISCONTINUED | OUTPATIENT
Start: 2023-03-24 | End: 2023-03-24 | Stop reason: HOSPADM

## 2023-03-24 RX ORDER — LIDOCAINE HYDROCHLORIDE 20 MG/ML
INJECTION, SOLUTION INFILTRATION; PERINEURAL AS NEEDED
Status: DISCONTINUED | OUTPATIENT
Start: 2023-03-24 | End: 2023-03-24 | Stop reason: SURG

## 2023-03-24 RX ORDER — FLUMAZENIL 0.1 MG/ML
0.2 INJECTION INTRAVENOUS AS NEEDED
Status: DISCONTINUED | OUTPATIENT
Start: 2023-03-24 | End: 2023-03-24 | Stop reason: HOSPADM

## 2023-03-24 RX ORDER — ONDANSETRON 2 MG/ML
INJECTION INTRAMUSCULAR; INTRAVENOUS AS NEEDED
Status: DISCONTINUED | OUTPATIENT
Start: 2023-03-24 | End: 2023-03-24 | Stop reason: SURG

## 2023-03-24 RX ORDER — SODIUM CHLORIDE 0.9 % (FLUSH) 0.9 %
3-10 SYRINGE (ML) INJECTION AS NEEDED
Status: DISCONTINUED | OUTPATIENT
Start: 2023-03-24 | End: 2023-03-24 | Stop reason: HOSPADM

## 2023-03-24 RX ORDER — FAMOTIDINE 10 MG/ML
20 INJECTION, SOLUTION INTRAVENOUS ONCE
Status: DISCONTINUED | OUTPATIENT
Start: 2023-03-24 | End: 2023-03-24 | Stop reason: HOSPADM

## 2023-03-24 RX ORDER — FENTANYL CITRATE 50 UG/ML
INJECTION, SOLUTION INTRAMUSCULAR; INTRAVENOUS AS NEEDED
Status: DISCONTINUED | OUTPATIENT
Start: 2023-03-24 | End: 2023-03-24 | Stop reason: SURG

## 2023-03-24 RX ORDER — MAGNESIUM HYDROXIDE 1200 MG/15ML
LIQUID ORAL AS NEEDED
Status: DISCONTINUED | OUTPATIENT
Start: 2023-03-24 | End: 2023-03-24 | Stop reason: HOSPADM

## 2023-03-24 RX ORDER — SODIUM CHLORIDE 9 MG/ML
INJECTION, SOLUTION INTRAVENOUS AS NEEDED
Status: DISCONTINUED | OUTPATIENT
Start: 2023-03-24 | End: 2023-03-24 | Stop reason: HOSPADM

## 2023-03-24 RX ORDER — ONDANSETRON 4 MG/1
4 TABLET, FILM COATED ORAL EVERY 8 HOURS PRN
Qty: 12 TABLET | Refills: 0 | Status: SHIPPED | OUTPATIENT
Start: 2023-03-24 | End: 2023-04-05

## 2023-03-24 RX ORDER — LEVOFLOXACIN 5 MG/ML
750 INJECTION, SOLUTION INTRAVENOUS ONCE
Status: COMPLETED | OUTPATIENT
Start: 2023-03-24 | End: 2023-03-24

## 2023-03-24 RX ORDER — HYDROMORPHONE HYDROCHLORIDE 1 MG/ML
0.25 INJECTION, SOLUTION INTRAMUSCULAR; INTRAVENOUS; SUBCUTANEOUS
Status: DISCONTINUED | OUTPATIENT
Start: 2023-03-24 | End: 2023-03-24 | Stop reason: HOSPADM

## 2023-03-24 RX ORDER — SODIUM CHLORIDE 0.9 % (FLUSH) 0.9 %
3 SYRINGE (ML) INJECTION EVERY 12 HOURS SCHEDULED
Status: DISCONTINUED | OUTPATIENT
Start: 2023-03-24 | End: 2023-03-24 | Stop reason: HOSPADM

## 2023-03-24 RX ORDER — METRONIDAZOLE 500 MG/100ML
500 INJECTION, SOLUTION INTRAVENOUS ONCE
Status: COMPLETED | OUTPATIENT
Start: 2023-03-24 | End: 2023-03-24

## 2023-03-24 RX ORDER — IPRATROPIUM BROMIDE AND ALBUTEROL SULFATE 2.5; .5 MG/3ML; MG/3ML
3 SOLUTION RESPIRATORY (INHALATION) ONCE AS NEEDED
Status: DISCONTINUED | OUTPATIENT
Start: 2023-03-24 | End: 2023-03-24 | Stop reason: HOSPADM

## 2023-03-24 RX ORDER — ROCURONIUM BROMIDE 10 MG/ML
INJECTION, SOLUTION INTRAVENOUS AS NEEDED
Status: DISCONTINUED | OUTPATIENT
Start: 2023-03-24 | End: 2023-03-24 | Stop reason: SURG

## 2023-03-24 RX ORDER — KETOROLAC TROMETHAMINE 30 MG/ML
INJECTION, SOLUTION INTRAMUSCULAR; INTRAVENOUS AS NEEDED
Status: DISCONTINUED | OUTPATIENT
Start: 2023-03-24 | End: 2023-03-24 | Stop reason: SURG

## 2023-03-24 RX ORDER — PROPOFOL 10 MG/ML
VIAL (ML) INTRAVENOUS AS NEEDED
Status: DISCONTINUED | OUTPATIENT
Start: 2023-03-24 | End: 2023-03-24 | Stop reason: SURG

## 2023-03-24 RX ORDER — BUPIVACAINE HYDROCHLORIDE AND EPINEPHRINE 5; 5 MG/ML; UG/ML
INJECTION, SOLUTION EPIDURAL; INTRACAUDAL; PERINEURAL AS NEEDED
Status: DISCONTINUED | OUTPATIENT
Start: 2023-03-24 | End: 2023-03-24 | Stop reason: HOSPADM

## 2023-03-24 RX ORDER — HYDRALAZINE HYDROCHLORIDE 20 MG/ML
5 INJECTION INTRAMUSCULAR; INTRAVENOUS
Status: DISCONTINUED | OUTPATIENT
Start: 2023-03-24 | End: 2023-03-24 | Stop reason: HOSPADM

## 2023-03-24 RX ORDER — ONDANSETRON 2 MG/ML
4 INJECTION INTRAMUSCULAR; INTRAVENOUS ONCE AS NEEDED
Status: DISCONTINUED | OUTPATIENT
Start: 2023-03-24 | End: 2023-03-24 | Stop reason: HOSPADM

## 2023-03-24 RX ORDER — DIPHENHYDRAMINE HYDROCHLORIDE 50 MG/ML
12.5 INJECTION INTRAMUSCULAR; INTRAVENOUS
Status: DISCONTINUED | OUTPATIENT
Start: 2023-03-24 | End: 2023-03-24 | Stop reason: HOSPADM

## 2023-03-24 RX ORDER — PROMETHAZINE HYDROCHLORIDE 25 MG/1
25 TABLET ORAL ONCE AS NEEDED
Status: DISCONTINUED | OUTPATIENT
Start: 2023-03-24 | End: 2023-03-24 | Stop reason: HOSPADM

## 2023-03-24 RX ORDER — FENTANYL CITRATE 50 UG/ML
25 INJECTION, SOLUTION INTRAMUSCULAR; INTRAVENOUS
Status: DISCONTINUED | OUTPATIENT
Start: 2023-03-24 | End: 2023-03-24 | Stop reason: HOSPADM

## 2023-03-24 RX ORDER — LABETALOL HYDROCHLORIDE 5 MG/ML
5 INJECTION, SOLUTION INTRAVENOUS
Status: DISCONTINUED | OUTPATIENT
Start: 2023-03-24 | End: 2023-03-24 | Stop reason: HOSPADM

## 2023-03-24 RX ORDER — LEVOFLOXACIN 5 MG/ML
500 INJECTION, SOLUTION INTRAVENOUS ONCE
Status: DISCONTINUED | OUTPATIENT
Start: 2023-03-24 | End: 2023-03-24

## 2023-03-24 RX ORDER — DROPERIDOL 2.5 MG/ML
0.62 INJECTION, SOLUTION INTRAMUSCULAR; INTRAVENOUS
Status: DISCONTINUED | OUTPATIENT
Start: 2023-03-24 | End: 2023-03-24 | Stop reason: HOSPADM

## 2023-03-24 RX ORDER — EPHEDRINE SULFATE 50 MG/ML
5 INJECTION, SOLUTION INTRAVENOUS ONCE AS NEEDED
Status: DISCONTINUED | OUTPATIENT
Start: 2023-03-24 | End: 2023-03-24 | Stop reason: HOSPADM

## 2023-03-24 RX ORDER — HYDROCODONE BITARTRATE AND ACETAMINOPHEN 7.5; 325 MG/1; MG/1
1 TABLET ORAL EVERY 4 HOURS PRN
Status: DISCONTINUED | OUTPATIENT
Start: 2023-03-24 | End: 2023-03-24 | Stop reason: HOSPADM

## 2023-03-24 RX ORDER — PROMETHAZINE HYDROCHLORIDE 25 MG/1
25 SUPPOSITORY RECTAL ONCE AS NEEDED
Status: DISCONTINUED | OUTPATIENT
Start: 2023-03-24 | End: 2023-03-24 | Stop reason: HOSPADM

## 2023-03-24 RX ADMIN — SUGAMMADEX 200 MG: 100 INJECTION, SOLUTION INTRAVENOUS at 08:32

## 2023-03-24 RX ADMIN — LIDOCAINE HYDROCHLORIDE 100 MG: 20 INJECTION, SOLUTION INFILTRATION; PERINEURAL at 07:47

## 2023-03-24 RX ADMIN — METRONIDAZOLE 500 MG: 500 INJECTION, SOLUTION INTRAVENOUS at 07:33

## 2023-03-24 RX ADMIN — FENTANYL CITRATE 25 MCG: 50 INJECTION, SOLUTION INTRAMUSCULAR; INTRAVENOUS at 07:47

## 2023-03-24 RX ADMIN — FENTANYL CITRATE 25 MCG: 50 INJECTION, SOLUTION INTRAMUSCULAR; INTRAVENOUS at 08:14

## 2023-03-24 RX ADMIN — LEVOFLOXACIN 750 MG: 5 INJECTION, SOLUTION INTRAVENOUS at 07:33

## 2023-03-24 RX ADMIN — PROPOFOL 200 MG: 10 INJECTION, EMULSION INTRAVENOUS at 07:47

## 2023-03-24 RX ADMIN — KETOROLAC TROMETHAMINE 15 MG: 30 INJECTION, SOLUTION INTRAMUSCULAR at 08:32

## 2023-03-24 RX ADMIN — ROCURONIUM BROMIDE 50 MG: 10 INJECTION, SOLUTION INTRAVENOUS at 07:47

## 2023-03-24 RX ADMIN — SODIUM CHLORIDE, POTASSIUM CHLORIDE, SODIUM LACTATE AND CALCIUM CHLORIDE 9 ML/HR: 600; 310; 30; 20 INJECTION, SOLUTION INTRAVENOUS at 06:58

## 2023-03-24 RX ADMIN — ONDANSETRON 4 MG: 2 INJECTION INTRAMUSCULAR; INTRAVENOUS at 08:32

## 2023-03-24 NOTE — ANESTHESIA PREPROCEDURE EVALUATION
" Anesthesia Evaluation     Patient summary reviewed and Nursing notes reviewed   no history of anesthetic complications:  NPO Solid Status: > 8 hours  NPO Liquid Status: > 2 hours           Airway   Mallampati: II  TM distance: >3 FB  Neck ROM: full  No difficulty expected  Dental    (+) implants    Pulmonary - normal exam   (+) a smoker Former,   Cardiovascular - normal exam  Exercise tolerance: excellent (>7 METS)    ECG reviewed    (-) angina, orthopnea, PND, DUMONT      Neuro/Psych  GI/Hepatic/Renal/Endo    (+)  GERD well controlled,      Musculoskeletal     (+) back pain (spinal stenosis), neck pain (cervical radic),   Abdominal    Substance History   (+) alcohol use (h/o alcoholism 20 yr ago),      OB/GYN          Other   arthritis,                    Anesthesia Plan    ASA 2     general     (I have reviewed the patient's history and chart with the patient, including all pertinent laboratory results and imaging. I have explained the risks of anesthesia including but not limited to dental damage, corneal abrasion, nerve injury, MI, stroke, aspiration, and death. Patient has agreed to proceed.       /73 (BP Location: Right arm, Patient Position: Lying)   Pulse 53   Temp 36.7 °C (98.1 °F) (Oral)   Resp 16   Ht 190.5 cm (75\")   Wt 83.9 kg (184 lb 15.8 oz)   SpO2 100%   BMI 23.12 kg/m²   )  intravenous induction     Anesthetic plan, risks, benefits, and alternatives have been provided, discussed and informed consent has been obtained with: patient.        CODE STATUS:       "

## 2023-03-24 NOTE — TELEPHONE ENCOUNTER
Spoke with patient and advised him no driving while using pain medication.  Patient voiced understanding.

## 2023-03-24 NOTE — ANESTHESIA POSTPROCEDURE EVALUATION
"Patient: Jhonatan Norman    Procedure Summary     Date: 03/24/23 Room / Location: [facility] OR [address] [place] [address] MAIN OR    Anesthesia Start: 0741 Anesthesia Stop: 0848    Procedure: APPENDECTOMY LAPAROSCOPIC (Abdomen) Diagnosis:       Acute appendicitis with localized peritonitis, without perforation, abscess, or gangrene      (Acute appendicitis with localized peritonitis, without perforation, abscess, or gangrene [K35.30])    Surgeons: Devonte Dawson MD Provider: Estefania Hall MD    Anesthesia Type: general ASA Status: 2          Anesthesia Type: general    Vitals  Vitals Value Taken Time   /69 03/24/23 0931   Temp 36.6 °C (97.9 °F) 03/24/23 0844   Pulse 49 03/24/23 0940   Resp 18 03/24/23 0930   SpO2 100 % 03/24/23 0941   Vitals shown include unvalidated device data.        Post Anesthesia Care and Evaluation    Patient location during evaluation: bedside  Patient participation: complete - patient participated  Level of consciousness: awake and alert  Pain management: adequate    Airway patency: patent  Anesthetic complications: No anesthetic complications    Cardiovascular status: acceptable  Respiratory status: acceptable  Hydration status: acceptable    Comments: /76   Pulse (!) 49   Temp 36.6 °C (97.9 °F) (Oral)   Resp 18   Ht 190.5 cm (75\")   Wt 83.9 kg (184 lb 15.8 oz)   SpO2 99%   BMI 23.12 kg/m²       "

## 2023-03-24 NOTE — TELEPHONE ENCOUNTER
Caller: LYNDSAY BLAKE    Relationship to patient: SELF    Best call back number: [phone]; OKAY FOR RESPONSE TO BE VISIBLE VIA Verafin.       Patient is needing: ANY SPECIFIC GUIDELINES ON DRIVING WHILE RECOVERING BEFORE COMING TO POST-OP APPT?

## 2023-03-24 NOTE — ANESTHESIA PROCEDURE NOTES
Airway  Urgency: elective    Date/Time: 3/24/2023 7:53 AM  Airway not difficult    General Information and Staff    Patient location during procedure: OR  Anesthesiologist: Estefania Hall MD    Indications and Patient Condition  Indications for airway management: airway protection    Preoxygenated: yes  Mask difficulty assessment: 1 - vent by mask    Final Airway Details  Final airway type: endotracheal airway      Successful airway: ETT  Cuffed: yes   Successful intubation technique: direct laryngoscopy  Facilitating devices/methods: intubating stylet and anterior pressure/BURP  Endotracheal tube insertion site: oral  Blade: Clarissa  Blade size: 4  ETT size (mm): 8.0  Cormack-Lehane Classification: grade III - view of epiglottis only  Placement verified by: chest auscultation and capnometry   Inital cuff pressure (cm H2O): 22  Cuff volume (mL): 8  Measured from: teeth  ETT/EBT  to teeth (cm): 22  Number of attempts at approach: 1  Assessment: lips, teeth, and gum same as pre-op and atraumatic intubation    Additional Comments  Anterior with mac blade, previously better view with sousa blade. ETT passed easily blind, placement confirmed by ascultation and sustained ETCO2. Atraumatic, tube secured

## 2023-03-24 NOTE — DISCHARGE INSTRUCTIONS
Dr. Devonte Dawson  [address]  [place] [address]  [phone]    Discharge Instructions for Lap Appendectomy    Go home, rest and take it easy today; however, you should get up and move about several times today to reduce the risk of developing a clot in your legs.      You may experience some dizziness or memory loss from the anesthesia.  This may last for the next 24 hours.  Someone should plan on staying with you for the first 24 hours for your safety.    Do not make any important legal decisions or sign any legal papers for the next 24 hours.      Eat and drink lightly today.  Start off with liquids, jello, soup, crackers or other bland foods at first. You may advance your diet tomorrow as tolerated as long as you do not experience any nausea or vomiting.     If skin glue (Dermabond) was used, your incisions are protected and covered.  The invisible glue will dissolve on its own as your incision heals. If dressings were used, you may remove your outer dressings in 3 days.  The white tapes called steri-strips should stay in place.  They will fall off on their own in 1-2 weeks.  Do not worry if they come off sooner.      If dressings were used, you may notice some bleeding/drainage on your outer dressings. A little bloody drainage is normal. If the bleeding/drainage is such that the bandage cannot absorb it, remove the dressing, apply clean gauze and apply firm pressure for a full 15 minutes.  If the bleeding continues, please call me.    You may shower tomorrow allowing water to run over the incisions; however, do not scrub your incisions.  No tub baths until your incisions are completely healed.         You have received a prescription for a narcotic pain medicine, as you will have some pain following surgery.   You will not be totally pain free, but your pain medicine should make the pain tolerable.  Please take your pain medicine as prescribed and always take your pills with food to  prevent nausea. If you are having severe pain that cannot be controlled by the pain medicine, please contact me.      You have also received a prescription for an anti-nausea medicine.  Please take this as prescribed for any nausea or vomiting.  Nausea could be a result of the anesthesia or a result of the narcotic pain medicine.  If you experience severe nausea and vomiting that cannot be controlled by the nausea medicine, please call me.      It is not unusual to experience pain/discomfort in your shoulders or under your ribs after surgery.  It is from the gas used during the laparoscopic procedure and usually lasts 1-3 days.  The prescription pain medicine is used to treat the surgical pain and does not typically alleviate this “gassy” pain.     No driving for 24 hours and for as long as you are taking your prescription pain medicine.      You will need to call the office at [phone] to schedule a follow-up appointment in 6-10 days.     Remember to contact me for any of the following:    Fever > 101 degrees  Severe pain that cannot be controlled by taking your pain pills  Severe nausea or vomiting that cannot be controlled by taking your nausea pills  Significant bleeding of your incisions  Drainage that has a bad smell or is yellow or green in appearance  Any other questions or concerns

## 2023-03-24 NOTE — OP NOTE
OPERATIVE REPORT     DATE OF OPERATION: 03/24/23     SURGEON:   Devonte Dawson    PREOPERATIVE DIAGNOSIS: Acute appendicitis    POSTOPERATIVE DIAGNOSIS: Acute appendicitis    PROCEDURE PERFORMED: Laparoscopic appendectomy    ANESTHESIA: General    SPECIMEN: Appendix    DRAINS: None    BLOOD LOSS: Minimal    INDICATIONS FOR OPERATION: Mr. Jhonatan Norman is a 77 y.o. year old with clinical history and imaging consistent with acute appendicitis. All risks (including bleeding, infection, damage to surrounding structures), benefits, and alternatives were explained to the patient and he agreed and wished to proceed.  Informed consent was obtained.    OPERATIVE REPORT: The patient was taken to the operating room, transferred onto the operating room table, and underwent general endotracheal anesthesia without incident. The patient was prepped and draped in the usual sterile fashion.  Preoperative antibiotics were given, and a timeout was performed.  Half percent Marcaine with epinephrine was and injected into the skin and subcutaneous tissues prior to all trocar placements and incisions.  5 mm trocar placed in the left lower quadrant using Optiview technique.  Veress needle site inspected and confirmed to be free of injury.  It was removed.  Infraumbilically a 12 mm trocar was placed under direct visualization.  Suprapubic 5 mm trocar was placed under direct visualization.  The appendix was identified in the right lower quadrant after the patient was placed in Trendelenburg position.   The mesoappendix was divided with the harmonic scalpel and using a blue load of the Saline stapler the appendix was divided at the base where it met the cecum and the tinea coli could be seen converging onto it.  The appendix was removed from the abdomen in a specimen bag.   The staple line was inspected for hemostasis.  The infraumbilical fascial incision was closed with 0 Vicryl in interrupted figure-of-eight using a suture passer.  12 mm  trocar reinserted and 5 mm trocars removed under direct visualization.  Insufflation released from the abdomen and fascial stitch tied down ensuring no herniation of bowel or omentum.  Skin closed with 4-0 Monocryl and glue applied.  All needle, instrument, sponge counts were correct.  Patient taken to PACU in stable condition.      Devonte Dawson M.D.  General and Endoscopic Surgery  [facility]    [address] [place] [address], [address]  [place], [place], [address]  P: [phone]  F: [phone]

## 2023-03-24 NOTE — H&P
General Surgery H&P/Consultation      Impression/Plan: 76 yo gentleman with abdominal pain and CT evidence of acute appendicitis.  No evidence of perforation. Levaquin/flagyl ordered for penicillin allergy history.  Proceed to OR for laparoscopic appendectomy.  Risk, benefits, and alternatives explained.    CC: Abdominal pain    HPI:   Mr. Jhonatan Norman is a 77 y.o. male that presented to the hospital with abdominal pain that started one day ago.  He underwent an outpatient CT scan showing appendicitis.  He started on Levaquin and his pain has improved but is still present.     Past Medical History:   Past Medical History:   Diagnosis Date   • Alcoholism (HCC)     HISTORY OF 20 YRS AGO   • GERD (gastroesophageal reflux disease)    • Hernia 15+ years ago    Surgically repaired   • Low back pain    • Nasal obstruction     CHRONIC   • Spinal stenosis        Past Surgical History:   Past Surgical History:   Procedure Laterality Date   • ABDOMINAL SURGERY  1975    vagotomy    pylorplasty   • BACK SURGERY      lumbar decompression   • CHOLECYSTECTOMY     • COLONOSCOPY  02/28/2013    fair prep, tics, stool, torts, IH   • ENDOSCOPIC FUNCTIONAL SINUS SURGERY (FESS) N/A 2/20/2019    Procedure: ENDOSCOPIC FUNCTIONAL SINUS SURGERY;  Surgeon: Kareem Weaver MD;  Location: [facility] OR [facility];  Service: ENT   • ENDOSCOPY  02/28/2013    Z line irregular, 45 cm from incisors, torts, HH, bilious gastric fluiod, gastritis, pylorplasty found, Mora's, reactive gastropathychronic inflammation, esophagitis,    • ENDOSCOPY N/A 1/17/2018    Z-line irregular, 43 cm from the incisors, gastritis, a pyroloplasty was found, characterized by healthy appearing mucosa, normal duodenal bulb and second portion of the duodenum   • ENDOSCOPY N/A 12/22/2021    Procedure: ESOPHAGOGASTRODUODENOSCOPY with biopsies (cold bx);  Surgeon: Jhonatan Powell MD;  Location: [facility] ENDOSCOPY;  Service: Gastroenterology;  Laterality: N/A;  pre - gerd,  periumbilical pain  post - gastritis, bile reflux, pyloroplasty   • EPIDURAL BLOCK     • EYE SURGERY      CATARACTS   • HERNIA REPAIR Right     inguinal hernia   • SEPTOPLASTY Bilateral 2019    Procedure: NASAL SEPTOPLASTY BILATERAL INFERIOR TURBINECTOMY;  Surgeon: Kareem Weaver MD;  Location: [facility] OR [facility];  Service: ENT   • UPPER GASTROINTESTINAL ENDOSCOPY  2 weeks ago   • VAGOTOMY AND PYLOROPLASTY         Medications:  Medications Prior to Admission   Medication Sig Dispense Refill Last Dose   • Al Hyd-Mg Tr-Alg Ac-Sod Bicarb (GAVISCON-2 PO) Take  by mouth.   3/23/2023 at 1200   • azelastine (ASTELIN) 0.1 % nasal spray    3/24/2023 at 0430   • Cholecalciferol (VITAMIN D3) 5000 units capsule capsule Take 1 capsule by mouth Daily.   3/23/2023 at [id number]   • famotidine (PEPCID) 20 MG tablet Take 1 tablet by mouth As Needed for Heartburn.   3/24/2023 at 0430   • fluticasone (FLONASE) 50 MCG/ACT nasal spray    3/24/2023 at 0430   • LEVOFLOXACIN PO Take  by mouth.   3/23/2023   • Immune Globulin, Human,-klhw 1 GM/5ML solution Inject  under the skin into the appropriate area as directed.   3/19/2023       Allergies:   Allergies   Allergen Reactions   • Augmentin [Amoxicillin-Pot Clavulanate] Itching     transaminitis       Social History:   Social History     Socioeconomic History   • Marital status:    • Years of education: college graduate   Tobacco Use   • Smoking status: Former     Packs/day: 2.00     Years: 10.00     Pack years: 20.00     Types: Cigarettes     Start date:      Quit date:      Years since quittin.2   • Smokeless tobacco: Never   Vaping Use   • Vaping Use: Never used   Substance and Sexual Activity   • Alcohol use: No     Comment: No alcohol for 20 years     • Drug use: No   • Sexual activity: Defer       Family History:   Family History   Problem Relation Age of Onset   • Cancer Other    • Ulcers Son    • Irritable bowel syndrome Daughter    • Diverticulitis Daughter     • Alcohol abuse Father    • Malig Hyperthermia Neg Hx        Review of Systems:  A comprehensive review of systems was negative except for the following positives: abdominal pain     Physical Exam:   Vitals:    03/24/23 0650   BP: 121/73   Pulse: 53   Resp: 16   Temp: 98.1 °F (36.7 °C)   SpO2: 100%     BMI: Body mass index is 23.12 kg/m².   GENERAL: no acute distress, awake and alert  HEENT: normocephalic, atraumatic, no scleral icterus, moist mucous membranes  NECK: Supple, there is no thyromegaly or lymphadenopathy  RESPIRATORY: symmetric excursion bilaterally, normal work of breathing, no wheezes  CARDIOVASCULAR: regular rate, well perfused  GASTROINTESTINAL: soft, mildly tender, well healed upper midline incision and subcostal incision  MUSCULOSKELETAL: no cyanosis, clubbing, or edema  NEUROLOGIC: alert and oriented, normal speech, cranial nerves 2-12 grossly intact, no focal deficits  SKIN: Moist, warm, no rashes, no jaundice      IMAGING:  CT abd/pelvis reviewed showing dilated appendix with surrounding stranding          Devonte Dawson MD  General and Endoscopic Surgery  [facility]    [address] [place] [address], [address]  [place] [address]  P: [phone]  F: [phone]

## 2023-03-25 LAB
LAB AP CASE REPORT: NORMAL
LAB AP DIAGNOSIS COMMENT: NORMAL
PATH REPORT.FINAL DX SPEC: NORMAL
PATH REPORT.GROSS SPEC: NORMAL

## 2023-04-05 ENCOUNTER — OFFICE VISIT (OUTPATIENT)
Dept: SURGERY | Facility: CLINIC | Age: 78
End: 2023-04-05
Payer: MEDICARE

## 2023-04-05 DIAGNOSIS — K35.30 ACUTE APPENDICITIS WITH LOCALIZED PERITONITIS, WITHOUT PERFORATION, ABSCESS, OR GANGRENE: Primary | ICD-10-CM

## 2023-04-05 PROCEDURE — 1159F MED LIST DOCD IN RCRD: CPT | Performed by: SURGERY

## 2023-04-05 PROCEDURE — 1160F RVW MEDS BY RX/DR IN RCRD: CPT | Performed by: SURGERY

## 2023-04-05 PROCEDURE — 99024 POSTOP FOLLOW-UP VISIT: CPT | Performed by: SURGERY

## 2023-04-05 RX ORDER — EPINEPHRINE 0.3 MG/.3ML
INJECTION SUBCUTANEOUS
COMMUNITY
Start: 2023-01-11

## 2023-04-07 NOTE — PROGRESS NOTES
ASSESSMENT/PLAN:    78 yo gentleman s/p laparoscopic appendectomy for acute appendicitis.  Pathology is reviewed and benign.  Incisions are healing well.  Recommend no lifting greater than 20 lbs until he is 2 weeks post op.  After that, no true activity restrictions. He will follow up on an as needed basis.      CC:     Chief Complaint   Patient presents with   • Post-op     Laparoscopic appendectomy 3/24/23           SOCIAL HISTORY:   Social Determinants of Health     Tobacco Use: Medium Risk   • Smoking Tobacco Use: Former   • Smokeless Tobacco Use: Never   • Passive Exposure: Not on file   Alcohol Use: Not on file   Financial Resource Strain: Not on file   Food Insecurity: Not on file   Transportation Needs: Not on file   Physical Activity: Not on file   Stress: Not on file   Social Connections: Not on file   Intimate Partner Violence: Not on file   Depression: Not on file   Housing Stability: Not on file        FAMILY HISTORY:    Family History   Problem Relation Age of Onset   • Cancer Other    • Ulcers Son    • Irritable bowel syndrome Daughter    • Cancer Daughter         BASAL CELL CARCINOMA ON NOSE   • Diverticulitis Daughter    • Alcohol abuse Father    • Hyperlipidemia Brother         overweight a bunch   • Malig Hyperthermia Neg Hx         OTHER SURGERY:  Past Surgical History:   Procedure Laterality Date   • ABDOMINAL SURGERY  1975    vagotomy    pylorplasty   • APPENDECTOMY N/A 3/24/2023    Procedure: APPENDECTOMY LAPAROSCOPIC;  Surgeon: Devonte Dawson MD;  Location: [facility] OR;  Service: General;  Laterality: N/A;   • BACK SURGERY      lumbar decompression   • CHOLECYSTECTOMY     • COLONOSCOPY  02/28/2013    fair prep, tics, stool, torts, IH   • ENDOSCOPIC FUNCTIONAL SINUS SURGERY (FESS) N/A 2/20/2019    Procedure: ENDOSCOPIC FUNCTIONAL SINUS SURGERY;  Surgeon: Kareem Weaver MD;  [place] [facility] [place] [facility] [place]  Service: ENT   • ENDOSCOPY  02/28/2013    Z line irregular, 45 cm from incisors,  torts, HH, bilious gastric fluiod, gastritis, pylorplasty found, Mora's, reactive gastropathychronic inflammation, esophagitis,    • ENDOSCOPY N/A 1/17/2018    Z-line irregular, 43 cm from the incisors, gastritis, a pyroloplasty was found, characterized by healthy appearing mucosa, normal duodenal bulb and second portion of the duodenum   • ENDOSCOPY N/A 12/22/2021    Procedure: ESOPHAGOGASTRODUODENOSCOPY with biopsies (cold bx);  Surgeon: Jhonatan Powell MD;  Location: [facility] ENDOSCOPY;  Service: Gastroenterology;  Laterality: N/A;  pre - gerd, periumbilical pain  post - gastritis, bile reflux, pyloroplasty   • EPIDURAL BLOCK     • EYE SURGERY      CATARACTS   • HERNIA REPAIR Right     inguinal hernia   • SEPTOPLASTY Bilateral 2/20/2019    Procedure: NASAL SEPTOPLASTY BILATERAL INFERIOR TURBINECTOMY;  Surgeon: Kareem Weaver MD;  [place] [facility] [place] [facility] [place]  Service: ENT   • UPPER GASTROINTESTINAL ENDOSCOPY  2 weeks ago   • VAGOTOMY AND PYLOROPLASTY          PAST MEDICAL HISTORY:    Past Medical History:   Diagnosis Date   • Alcoholism     HISTORY OF 20 YRS AGO   • Cancer JUNE 2019    BASAL CELL CARCINOMA ON NOSE   • Colon polyp 10 YEARS OMAYRA   • GERD (gastroesophageal reflux disease)    • Hernia 15+ years ago    Surgically repaired   • Low back pain    • Nasal obstruction     CHRONIC   • Pancreatic cancer    • Spinal stenosis         MEDICATIONS:   Current Outpatient Medications on File Prior to Visit   Medication Sig Dispense Refill   • Al Hyd-Mg Tr-Alg Ac-Sod Bicarb (GAVISCON-2 PO) Take  by mouth.     • azelastine (ASTELIN) 0.1 % nasal spray      • Cholecalciferol (VITAMIN D3) 5000 units capsule capsule Take 1 capsule by mouth Daily.     • EPINEPHrine (EPIPEN) 0.3 MG/0.3ML solution auto-injector injection      • famotidine (PEPCID) 20 MG tablet Take 1 tablet by mouth As Needed for Heartburn.     • fluticasone (FLONASE) 50 MCG/ACT nasal spray      • Immune Globulin, Human,-klhw 1 GM/5ML solution Inject   under the skin into the appropriate area as directed.       No current facility-administered medications on file prior to visit.        ALLERGIES:   Allergies   Allergen Reactions   • Augmentin [Amoxicillin-Pot Clavulanate] Itching     transaminitis            Devonte Dawson M.D.  General and Endoscopic Surgery  [facility]    [address], [address]  [place], [place], [address]  P: [phone]  F: [phone]     Answers for HPI/ROS submitted by the patient on 3/31/2023  What is the primary reason for your visit?: Other  Please describe your symptoms.: FOLLOW UP FROM APPENDECTOMY  Have you had these symptoms before?: Yes  How long have you been having these symptoms?: 1-4 days  Please describe any probable cause for these symptoms. : ROTTEN APPENDIX

## 2023-04-20 NOTE — PROGRESS NOTES
Subjective   History of Present Illness: Jhonatan Norman is a 77 y.o. male is here today for follow-up via telephone visit for 1 year follow-up.    You have chosen to receive care through a telephone visit. Do you consent to use a telephone visit for your medical care today? Yes    Unable to complete visit using a video connection to the patient. A phone visit was used to complete this visits. Total time of discussion was  11 minutes.    I was calling from the hospital.  He was at home.  Things have been about the same.  He is had to deal with some COVID over the last year but his back pain leg pain and calf weakness are about the same.  No reason to do anything differently.  We will keep an eye on him and see him in a year and if there is any worsening between now and that he will let us know.    History of Present Illness    Tobacco Use: Medium Risk   • Smoking Tobacco Use: Former   • Smokeless Tobacco Use: Never   • Passive Exposure: Not on file        The following portions of the patient's history were reviewed and updated as appropriate: allergies, current medications, past family history, past medical history, past social history, past surgical history and problem list.    Review of Systems    Objective     There were no vitals filed for this visit.  There is no height or weight on file to calculate BMI.      Physical Exam  Neurologic Exam      Assessment & Plan   Independent Review of Radiographic Studies:      I personally reviewed the images from the following studies.    I reviewed the lumbar MRI and plain x-rays done on 7/22/2020.  Of note he does have a transitional segment.  The spondylolisthesis is at L4-L5 and he is very stenotic at that level and the level below at L5-S1.  There has not really been any dramatic change since 2016.  Agree with the report.    Medical Decision Making:      Clinically stable.  We will see him in a year, sooner if there is some worsening.  He would let us know about  that    Return in about 1 year (around 4/21/2024) for face to face.    Diagnoses and all orders for this visit:    1. Spondylolisthesis at L4-L5 level (Primary)    2. Calf muscle weakness    3. Spinal stenosis of lumbar region with neurogenic claudication      MD Castro Maurer MD FACS FAANS  Neurological Surgery

## 2023-04-21 ENCOUNTER — OFFICE VISIT (OUTPATIENT)
Dept: NEUROSURGERY | Facility: CLINIC | Age: 78
End: 2023-04-21
Payer: MEDICARE

## 2023-04-21 DIAGNOSIS — M48.062 SPINAL STENOSIS OF LUMBAR REGION WITH NEUROGENIC CLAUDICATION: ICD-10-CM

## 2023-04-21 DIAGNOSIS — M43.16 SPONDYLOLISTHESIS AT L4-L5 LEVEL: Primary | ICD-10-CM

## 2023-04-21 DIAGNOSIS — M62.81 CALF MUSCLE WEAKNESS: ICD-10-CM

## 2023-04-21 PROCEDURE — 1159F MED LIST DOCD IN RCRD: CPT | Performed by: NEUROLOGICAL SURGERY

## 2023-04-21 PROCEDURE — 1160F RVW MEDS BY RX/DR IN RCRD: CPT | Performed by: NEUROLOGICAL SURGERY

## 2023-04-21 PROCEDURE — 99442 PR PHYS/QHP TELEPHONE EVALUATION 11-20 MIN: CPT | Performed by: NEUROLOGICAL SURGERY

## 2023-04-28 ENCOUNTER — OFFICE VISIT (OUTPATIENT)
Dept: GASTROENTEROLOGY | Facility: CLINIC | Age: 78
End: 2023-04-28
Payer: MEDICARE

## 2023-04-28 VITALS
WEIGHT: 188.8 LBS | HEIGHT: 75 IN | OXYGEN SATURATION: 97 % | TEMPERATURE: 97.8 F | BODY MASS INDEX: 23.48 KG/M2 | HEART RATE: 58 BPM | SYSTOLIC BLOOD PRESSURE: 96 MMHG | DIASTOLIC BLOOD PRESSURE: 60 MMHG

## 2023-04-28 DIAGNOSIS — K21.9 GASTROESOPHAGEAL REFLUX DISEASE, UNSPECIFIED WHETHER ESOPHAGITIS PRESENT: Primary | ICD-10-CM

## 2023-04-28 NOTE — PROGRESS NOTES
"Chief Complaint  Heartburn, Abdominal Pain, and Constipation    Subjective          History of Present Illness    Jhonatan Norman is a  77 y.o. male presents for evaluation of heartburn, abdominal pain, and constipation.  He is a patient of Dr. Powell last seen on 12/22/2021.  He is new to me.    He has a longstanding history of heartburn with history of pyloroplasty and \"peptic ulcer surgery.\"  Failed Prevacid (loss of smell) and Nexium due to side effects. Tums caused constipation. He is currently taking Pepcid 20 mg as needed for flares. Has tinnitus and this was worsening and read that pepcid can worsens this. Does drink coffee. Using gaviscon as needed.     He had appendix removed on 3/24/2023--he had called complaining of abdominal pain and went to the ED.  He follows with Dr. Khalil for spondylolisthesis at L4-L5 and lumbar spinal stenosis with neurogenic claudication.    12/22/2021 EGD for periumbilical pain and GERD showed irregular Z-line, bilious fluid in stomach, pyloroplasty with healthy appearing mucosa in esophagus, stomach, and duodenum.  Pathology showed unremarkable duodenum, reflux esophagitis, and benign small bowel anastomotic mucosa.    Last colonoscopy in 2013.  Recall would be 2023. Scheduled 5/24/23.     Receives IGG infusions for immunodeficiency syndrome.     Objective   Vital Signs:   BP 96/60   Pulse 58   Temp 97.8 °F (36.6 °C)   Ht 190.5 cm (75\")   Wt 85.6 kg (188 lb 12.8 oz)   SpO2 97%   BMI 23.60 kg/m²       Physical Exam  Vitals reviewed.   Constitutional:       General: He is awake. He is not in acute distress.     Appearance: Normal appearance. He is well-developed and well-groomed.   HENT:      Head: Normocephalic.   Pulmonary:      Effort: Pulmonary effort is normal. No respiratory distress.   Skin:     Coloration: Skin is not pale.   Neurological:      Mental Status: He is alert and oriented to person, place, and time.      Gait: Gait is intact.   Psychiatric:       "   Mood and Affect: Mood and affect normal.         Speech: Speech normal.         Behavior: Behavior is cooperative.         Judgment: Judgment normal.          Result Review :             Assessment and Plan    Diagnoses and all orders for this visit:    1. Gastroesophageal reflux disease, unspecified whether esophagitis present (Primary)    Continue pepcid and gaviscon as needed for GERD.    Follow up for Colonoscopy as scheduled.       Follow Up   No follow-ups on file.    Dragon dictation used throughout this note.     Peyton Solo PA-C

## 2023-05-24 ENCOUNTER — ANESTHESIA (OUTPATIENT)
Dept: GASTROENTEROLOGY | Facility: HOSPITAL | Age: 78
End: 2023-05-24
Payer: MEDICARE

## 2023-05-24 ENCOUNTER — HOSPITAL ENCOUNTER (OUTPATIENT)
Facility: HOSPITAL | Age: 78
Setting detail: HOSPITAL OUTPATIENT SURGERY
Discharge: HOME | End: 2023-05-24
Attending: INTERNAL MEDICINE | Admitting: INTERNAL MEDICINE
Payer: MEDICARE

## 2023-05-24 ENCOUNTER — ANESTHESIA EVENT (OUTPATIENT)
Dept: GASTROENTEROLOGY | Facility: HOSPITAL | Age: 78
End: 2023-05-24
Payer: MEDICARE

## 2023-05-24 VITALS
DIASTOLIC BLOOD PRESSURE: 58 MMHG | OXYGEN SATURATION: 95 % | RESPIRATION RATE: 16 BRPM | WEIGHT: 180 LBS | SYSTOLIC BLOOD PRESSURE: 91 MMHG | HEIGHT: 75 IN | HEART RATE: 53 BPM | BODY MASS INDEX: 22.38 KG/M2

## 2023-05-24 PROCEDURE — 25010000002 PROPOFOL 1000 MG/100ML EMULSION: Performed by: ANESTHESIOLOGY

## 2023-05-24 PROCEDURE — 25010000002 PROPOFOL 10 MG/ML EMULSION: Performed by: ANESTHESIOLOGY

## 2023-05-24 PROCEDURE — G0121 COLON CA SCRN NOT HI RSK IND: HCPCS | Performed by: INTERNAL MEDICINE

## 2023-05-24 PROCEDURE — S0260 H&P FOR SURGERY: HCPCS | Performed by: INTERNAL MEDICINE

## 2023-05-24 RX ORDER — PROPOFOL 10 MG/ML
INJECTION, EMULSION INTRAVENOUS AS NEEDED
Status: DISCONTINUED | OUTPATIENT
Start: 2023-05-24 | End: 2023-05-24 | Stop reason: SURG

## 2023-05-24 RX ORDER — SODIUM CHLORIDE, SODIUM LACTATE, POTASSIUM CHLORIDE, CALCIUM CHLORIDE 600; 310; 30; 20 MG/100ML; MG/100ML; MG/100ML; MG/100ML
1000 INJECTION, SOLUTION INTRAVENOUS CONTINUOUS
Status: DISCONTINUED | OUTPATIENT
Start: 2023-05-24 | End: 2023-05-24 | Stop reason: HOSPADM

## 2023-05-24 RX ORDER — SODIUM CHLORIDE 0.9 % (FLUSH) 0.9 %
10 SYRINGE (ML) INJECTION AS NEEDED
Status: DISCONTINUED | OUTPATIENT
Start: 2023-05-24 | End: 2023-05-24 | Stop reason: HOSPADM

## 2023-05-24 RX ORDER — LIDOCAINE HYDROCHLORIDE 20 MG/ML
INJECTION, SOLUTION INFILTRATION; PERINEURAL AS NEEDED
Status: DISCONTINUED | OUTPATIENT
Start: 2023-05-24 | End: 2023-05-24 | Stop reason: SURG

## 2023-05-24 RX ADMIN — PROPOFOL 140 MG: 10 INJECTION, EMULSION INTRAVENOUS at 13:03

## 2023-05-24 RX ADMIN — PROPOFOL 200 MCG/KG/MIN: 10 INJECTION, EMULSION INTRAVENOUS at 13:03

## 2023-05-24 RX ADMIN — LIDOCAINE HYDROCHLORIDE 60 MG: 20 INJECTION, SOLUTION INFILTRATION; PERINEURAL at 13:03

## 2023-05-24 RX ADMIN — SODIUM CHLORIDE, POTASSIUM CHLORIDE, SODIUM LACTATE AND CALCIUM CHLORIDE 1000 ML: 600; 310; 30; 20 INJECTION, SOLUTION INTRAVENOUS at 12:37

## 2023-05-24 NOTE — ANESTHESIA POSTPROCEDURE EVALUATION
"Patient: Jhonatan Norman    Procedure Summary     Date: 05/24/23 Room / Location:  [facility]    Anesthesia Start: 1254 Anesthesia Stop: 1331    Procedure: COLONOSCOPY to CECUM AND TERMINAL ILEUM Diagnosis:       Diverticulosis      Hemorrhoids      (Screening for colorectal cancer [Z12.11, Z12.12])    Surgeons: Jhonatan Powell MD Provider: Gabriel Beard DO    Anesthesia Type: MAC ASA Status: 2          Anesthesia Type: MAC    Vitals  Vitals Value Taken Time   BP 91/58 05/24/23 1348   Temp     Pulse 53 05/24/23 1348   Resp 16 05/24/23 1348   SpO2 95 % 05/24/23 1348           Post Anesthesia Care and Evaluation    Patient location during evaluation: bedside  Patient participation: complete - patient participated  Level of consciousness: awake and alert  Pain management: adequate    Airway patency: patent  Anesthetic complications: No anesthetic complications  PONV Status: controlled  Cardiovascular status: acceptable and hemodynamically stable  Respiratory status: acceptable, spontaneous ventilation and nonlabored ventilation  Hydration status: acceptable    Comments: BP 91/58 (BP Location: Left arm, Patient Position: Lying)   Pulse 53   Resp 16   Ht 190.5 cm (75\")   Wt 81.6 kg (180 lb)   SpO2 95%   BMI 22.50 kg/m²         "

## 2023-05-24 NOTE — H&P
[facility]  Pre Procedure History & Physical    Chief Complaint:   Screening for colorectal cancer    Subjective     HPI:   77-year-old male presents to the endoscopy unit for colonoscopic examination.  This is a screening assessment for colorectal cancer.  Last colonoscopy reported in 2013.    Past Medical History:   Past Medical History:   Diagnosis Date   • Alcoholism     HISTORY OF 20 YRS AGO   • Cancer JUNE 2019    BASAL CELL CARCINOMA ON NOSE   • Colon polyp 10 YEARS OMAYRA   • GERD (gastroesophageal reflux disease)    • Hernia 15+ years ago    Surgically repaired   • Lactose intolerance 30+ yrs ago   • Low back pain    • Nasal obstruction     CHRONIC   • Pancreatic cancer    • Spinal stenosis    • Tinnitus        Past Surgical History:  Past Surgical History:   Procedure Laterality Date   • ABDOMINAL SURGERY  1975    vagotomy    pylorplasty   • APPENDECTOMY N/A 3/24/2023    Procedure: APPENDECTOMY LAPAROSCOPIC;  Surgeon: Devonte Dawson MD;  Location: [facility] OR;  Service: General;  Laterality: N/A;   • BACK SURGERY      lumbar decompression   • CHOLECYSTECTOMY     • COLONOSCOPY  02/28/2013    fair prep, tics, stool, torts, IH   • ENDOSCOPIC FUNCTIONAL SINUS SURGERY (FESS) N/A 2/20/2019    Procedure: ENDOSCOPIC FUNCTIONAL SINUS SURGERY;  Surgeon: Kareem Weaver MD;  Location: [facility];  Service: ENT   • ENDOSCOPY  02/28/2013    Z line irregular, 45 cm from incisors, torts, HH, bilious gastric fluiod, gastritis, pylorplasty found, Mora's, reactive gastropathychronic inflammation, esophagitis,    • ENDOSCOPY N/A 1/17/2018    Z-line irregular, 43 cm from the incisors, gastritis, a pyroloplasty was found, characterized by healthy appearing mucosa, normal duodenal bulb and second portion of the duodenum   • ENDOSCOPY N/A 12/22/2021    Procedure: ESOPHAGOGASTRODUODENOSCOPY with biopsies (cold bx);  Surgeon: Jhonatan Powell MD;  Location: [facility] ENDOSCOPY;  Service:  Gastroenterology;  Laterality: N/A;  pre - gerd, periumbilical pain  post - gastritis, bile reflux, pyloroplasty   • EPIDURAL BLOCK     • EYE SURGERY      CATARACTS   • HERNIA REPAIR Right     inguinal hernia   • SEPTOPLASTY Bilateral 2/20/2019    Procedure: NASAL SEPTOPLASTY BILATERAL INFERIOR TURBINECTOMY;  Surgeon: Kareem Weaver MD;  [place] [facility] [place] [facility] [place]  Service: ENT   • UPPER GASTROINTESTINAL ENDOSCOPY  2 weeks ago   • VAGOTOMY AND PYLOROPLASTY         Family History:  Family History   Problem Relation Age of Onset   • Cancer Other    • Ulcers Son    • Irritable bowel syndrome Daughter    • Cancer Daughter         BASAL CELL CARCINOMA ON NOSE   • Diverticulitis Daughter    • Alcohol abuse Father    • Hyperlipidemia Brother         overweight a bunch   • Malig Hyperthermia Neg Hx        Social History:   reports that he quit smoking about 53 years ago. His smoking use included cigarettes, pipe, and cigars. He started smoking about 63 years ago. He has a 15.00 pack-year smoking history. He has never used smokeless tobacco. He reports that he does not drink alcohol and does not use drugs.    Medications:   Medications Prior to Admission   Medication Sig Dispense Refill Last Dose   • Al Hyd-Mg Tr-Alg Ac-Sod Bicarb (GAVISCON-2 PO) Take  by mouth As Needed.      • azelastine (ASTELIN) 0.1 % nasal spray 2 sprays into the nostril(s) as directed by provider 2 (Two) Times a Day.      • Cholecalciferol (VITAMIN D3) 5000 units capsule capsule Take 1 capsule by mouth Daily.      • EPINEPHrine (EPIPEN) 0.3 MG/0.3ML solution auto-injector injection       • famotidine (PEPCID) 20 MG tablet Take 1 tablet by mouth As Needed for Heartburn.      • fluticasone (FLONASE) 50 MCG/ACT nasal spray       • Immune Globulin, Human,-klhw 1 GM/5ML solution Inject  under the skin into the appropriate area as directed 1 (One) Time Per Week.          Allergies:  Augmentin [amoxicillin-pot clavulanate]    ROS:    Pertinent items are  noted in HPI, all other systems reviewed and negative     Objective     There were no vitals taken for this visit.    Physical Exam   Constitutional: Pt is oriented to person, place, and time and well-developed, well-nourished, and in no distress.   Mouth/Throat: Oropharynx is clear and moist.   Neck: Normal range of motion.   Cardiovascular: Normal rate, regular rhythm and normal heart sounds.    Pulmonary/Chest: Effort normal and breath sounds normal.   Abdominal: Soft. Nontender  Skin: Skin is warm and dry.   Psychiatric: Mood, memory, affect and judgment normal.     Assessment & Plan     Diagnosis:  Screening for colorectal cancer    Anticipated Surgical Procedure:  Colonoscopy    The risks, benefits, and alternatives of this procedure have been discussed with the patient or the responsible party- the patient understands and agrees to proceed.

## 2023-05-24 NOTE — ANESTHESIA PREPROCEDURE EVALUATION
Anesthesia Evaluation     Patient summary reviewed   no history of anesthetic complications:  NPO Solid Status: > 8 hours  NPO Liquid Status: > 2 hours           Airway   Mallampati: II  TM distance: >3 FB  Neck ROM: full  No difficulty expected  Dental    (+) implants    Pulmonary     breath sounds clear to auscultation  (+) a smoker Former,   (-) shortness of breath, recent URI  Cardiovascular   Exercise tolerance: excellent (>7 METS)    ECG reviewed  Rhythm: regular  Rate: normal    (-) past MI, dysrhythmias, angina    ROS comment: Sinus rhythm  No change from previous tracing  Electronically Signed By: Roman Webster ([facility]) 24-Mar-2023 13:16:26  Date and Time of Study: 2023-03-24 07:07:38    Neuro/Psych  (+) numbness (cervical radic),    (-) seizures, CVA  GI/Hepatic/Renal/Endo    (+)  GERD,    (-)  obesity, no renal disease, diabetes    Musculoskeletal     (+) back pain (spinal stenosis), neck pain (cervical radic),   Abdominal    Substance History   (+) alcohol use (h/o alcoholism 20 yr ago),      OB/GYN          Other   arthritis,    history of cancer (pancreatic)                      Anesthesia Plan    ASA 2     MAC     (MAC anesthesia discussed with patient and/or patient representative. Risks (including but not limited to intra-op awareness), benefits, and alternatives were discussed. Understanding was voiced with an agreement to proceed with a MAC technique and General as a backup option. )    Anesthetic plan, risks, benefits, and alternatives have been provided, discussed and informed consent has been obtained with: patient.        CODE STATUS:

## 2023-10-06 ENCOUNTER — TELEPHONE (OUTPATIENT)
Dept: NEUROSURGERY | Facility: OTHER | Age: 78
End: 2023-10-06
Payer: MEDICARE

## 2023-10-06 NOTE — TELEPHONE ENCOUNTER
PATIENT IS SCHEDULED FOR F/U DUE TO WORSENING SYMPTOMS IN THE CERVICAL SPINE - PATIENT WAS LIFTING A WINDOW USING HIS LEGS AND THATS WHEN THE PAIN STARTED- PATIENT IS ALSO HAVING ALOT OF ISSUES SWALLOWING.     PATIENT IS REQUESTING TO KEEP THE APPT FOR DECEMBER 27TH MAYBE TO USE FOR HIS SPRING VISIT IF THAT IS OKAY? BUT HE IS INQUIRING IF WE HAVE ANYTHING AVAILABLE SOONER WITH APRN TO FOLLOW UP REGARDING THE NECK PAIN.    PLEASE ADVISE- THANK YOU    C/B- [phone]

## 2023-10-23 NOTE — PROGRESS NOTES
Subjective   Patient ID: Jhonatan Norman is a 77 y.o. male is here today for neck pain after lifting a heavy window.  He was last seen in the office 4/21/2023 for follow-up of lumbar spondylolisthesis, spinal stenosis with neurogenic claudication calf muscle weakness.  He is due for routine follow-up in April 2024.  He was last seen for cervical issues in October 2019.     History of Present Illness  Today, patient reports returning to [facility] recently after hiatus with COVID. He has slowly progressed his activity but kept weight low. He has had some soreness in the back of his neck, but a few weeks ago he was lifting a heavy window and began to have increase pain in the posterior neck. He had associated limited ROM with sharp pain. Some chronic swallow issues and reflux. Noticed some slight worsening with onset of neck pain. It is now more of a soreness or stiffness.  His ROM has improved. He denies shooting pain down his back. No arm pain, numbness, tingling, weakness. No new imbalance or dexterity. Continues to have numbness and weakness in bilateral feet. Left extremities are worse than right. He is not taking even OTC pain relievers.He significantly reduced his weights and exercises except cardio. He rides a trail bike routinely. Remote smoking history.  No history of cancer.  No prior neck surgery.     The following portions of the patient's history were reviewed and updated as appropriate: allergies, current medications, past family history, past medical history, past social history, past surgical history, and problem list.    Review of Systems   HENT:  Positive for trouble swallowing.    Genitourinary:  Negative for difficulty urinating and enuresis.   Musculoskeletal:  Positive for gait problem, neck pain and neck stiffness.   Neurological:  Positive for weakness and numbness.       Objective   Physical Exam  Vitals reviewed.   Constitutional:       Appearance: Normal appearance.   Pulmonary:      Effort:  Pulmonary effort is normal.   Musculoskeletal:      Cervical back: Normal range of motion and neck supple. No tenderness.      Comments:   Negative Lhermitte's, negative Spurling   Neurological:      General: No focal deficit present.      Mental Status: He is alert.      Coordination: Romberg Test normal.      Gait: Gait is intact.   Psychiatric:         Mood and Affect: Mood normal.         Speech: Speech normal.         Thought Content: Thought content normal.       Neurologic Exam     Mental Status   Speech: speech is normal   Level of consciousness: alert  Knowledge: good.   Normal comprehension.     Motor Exam   Muscle bulk: normal  Overall muscle tone: normal    Strength   Strength 5/5 except as noted.   Left anterior tibial: 4/5  Left gastroc: 3/5    Gait, Coordination, and Reflexes     Gait  Gait: normal    Coordination   Romberg: negative    Reflexes   Right Kaminski: absent  Left Kaminski: absent      Assessment & Plan   Independent Review of Radiographic Studies:    No recent spine imaging    Medical Decision Making:    Patient presents for evaluation of acute posterior neck pain that began after lifting a difficult window.  He was stiff and sore and had some sharp pain initially but overall this has substantially improved with both discomfort and range of motion.  Was also having some swallowing discomfort increase in acid reflux which I do not think are specifically associated.  He denies any radicular symptoms or changes in balance although he has some mild chronic balance related to bilateral lower extremity numbness and weakness that he is followed for routinely for lumbar spinal stenosis.  None of his symptoms have worsened from baseline.  On exam he has left distal lower extremity weakness but otherwise strength and gait are intact.  No Adria and does not appear myelopathic.  I do not think further work-up is warranted as he is overall feeling better and I think it is a simple musculoskeletal  injury which is improving with time.  He has not required even over-the-counter pain relievers.  I encouraged him to continue working on his range of motion exercises and slowly increasing his activity but to avoid impact long-term.  We will follow-up as planned in April with Dr. Khalil.    Diagnoses and all orders for this visit:    1. Acute neck pain (Primary)      Return for Follow-up with Dr. Khalil as scheduled in 4/2024.

## 2023-10-24 ENCOUNTER — OFFICE VISIT (OUTPATIENT)
Dept: NEUROSURGERY | Facility: CLINIC | Age: 78
End: 2023-10-24
Payer: MEDICARE

## 2023-10-24 VITALS
DIASTOLIC BLOOD PRESSURE: 78 MMHG | HEIGHT: 75 IN | WEIGHT: 186.2 LBS | OXYGEN SATURATION: 97 % | SYSTOLIC BLOOD PRESSURE: 110 MMHG | BODY MASS INDEX: 23.15 KG/M2 | HEART RATE: 99 BPM

## 2023-10-24 DIAGNOSIS — M54.2 ACUTE NECK PAIN: Primary | ICD-10-CM

## 2023-10-24 PROCEDURE — 99213 OFFICE O/P EST LOW 20 MIN: CPT | Performed by: NURSE PRACTITIONER

## 2023-10-24 RX ORDER — IMMUNE GLOBULIN SUBCUTANEOUS, HUMAN-KLHW 200 MG/ML
14 SOLUTION SUBCUTANEOUS
COMMUNITY

## 2024-04-19 NOTE — PROGRESS NOTES
"Subjective   Patient ID: Jhonatan Norman is a 78 y.o. male is here today for follow-up.    It has been actually 2 years since have seen him face-to-face.  He was seen last fall by one of our nurse practitioners because of acute flareup of chronic neck pain.  He was lifting a heavy window.  That seems to have settled down but he does still have some neck pain.  No radiating arm pain.  He is concerned about his sense of more weakness in his legs.  He has had chronic left calf weakness for years.  He does not have more pain but just more difficulty walking and a sense that he is stumbling.  It seems to have involved his right leg as well.  The traditionally the weakness has been in his left calf.  We have not gotten an MRI since 2020 and is probably the time to get 1 again.  Will get x-rays of his neck and his low back as well as a new MRI.  I told him this is not necessarily a prelude to surgery but I think we need to sort out structural what happened since 2020.  He is recently rejoined [facility] and starting to work on some of the weight machines upstairs.  I told him to start using more of the machines at a lighter weight so he can help stave off some atrophy of the muscles.        History of Present Illness    The following portions of the patient's history were reviewed and updated as appropriate: allergies, current medications, past family history, past medical history, past social history, past surgical history, and problem list.    Review of Systems   Constitutional:  Negative for fever.   Musculoskeletal:  Negative for back pain.   Neurological:  Positive for weakness (right foot) and numbness (feet).   All other systems reviewed and are negative.          Objective     Vitals:    04/22/24 1100   BP: 122/80   BP Location: Left arm   Patient Position: Sitting   Cuff Size: Adult   Resp: 20   Weight: 84.4 kg (186 lb)   Height: 190.5 cm (75\")   PainSc: 0-No pain     Body mass index is 23.25 kg/m².    Tobacco Use: " Medium Risk (4/22/2024)    Patient History     Smoking Tobacco Use: Former     Smokeless Tobacco Use: Never     Passive Exposure: Not on file          Physical Exam  Neurologic Exam         Objective  Physical Exam  Constitutional:       Appearance: He is well-developed.   HENT:      Head: Normocephalic and atraumatic.   Eyes:      Extraocular Movements: EOM normal.      Conjunctiva/sclera: Conjunctivae normal.      Pupils: Pupils are equal, round, and reactive to light.   Neck:      Vascular: No carotid bruit.   Neurological:      Mental Status: He is oriented to person, place, and time.      Coordination: Finger-Nose-Finger Test and Heel to Shin Test normal.      Gait: Gait is intact.      Deep Tendon Reflexes:      Reflex Scores:       Tricep reflexes are 2+ on the right side and 2+ on the left side.       Bicep reflexes are 2+ on the right side and 2+ on the left side.       Brachioradialis reflexes are 2+ on the right side and 2+ on the left side.       Patellar reflexes are 2+ on the right side and 2+ on the left side.       Achilles reflexes are 2+ on the right side and 2+ on the left side.  Psychiatric:         Speech: Speech normal.         Neurologic Exam      Mental Status   Oriented to person, place, and time.   Registration of memory: Good recent and remote memory.   Attention: normal. Concentration: normal.   Speech: speech is normal   Level of consciousness: alert  Knowledge: consistent with education.     Cranial Nerves      CN II   Visual fields full to confrontation.   Visual acuity: normal     CN III, IV, VI   Pupils are equal, round, and reactive to light.  Extraocular motions are normal.      CN V   Facial sensation intact.   Right corneal reflex: normal  Left corneal reflex: normal     CN VII   Facial expression full, symmetric.   Right facial weakness: none  Left facial weakness: none     CN VIII   Hearing: intact     CN IX, X   Palate: symmetric     CN XI   Right sternocleidomastoid strength:  normal  Left sternocleidomastoid strength: normal     CN XII   Tongue: not atrophic  Tongue deviation: none     Motor Exam   Muscle bulk: normal  Right arm tone: normal  Left arm tone: normal  Right leg tone: normal  Left leg tone: normal     Strength   Strength 5/5 except as noted.   Left gastroc: 2/5     Sensory Exam   Light touch normal.     Gait, Coordination, and Reflexes      Gait  Gait: normal     Coordination   Finger to nose coordination: normal  Heel to shin coordination: normal     Reflexes   Right brachioradialis: 2+  Left brachioradialis: 2+  Right biceps: 2+  Left biceps: 2+  Right triceps: 2+  Left triceps: 2+  Right patellar: 2+  Left patellar: 2+  Right achilles: 2+  Left achilles: 2+  Right : 2+  Left : 2+    Assessment & Plan   Independent Review of Radiographic Studies:      I personally reviewed the images from the following studies.    I reviewed the lumbar MRI and plain x-rays done on 7/22/2020.  Of note he does have a transitional segment.  The spondylolisthesis is at L4-L5 and he is very stenotic at that level and the level below at L5-S1.  There has not really been any dramatic change since 2016.  Agree with the report.     Medical Decision Making:      Will go ahead and get new pictures including MRI of the lumbar spine as well as x-rays of the neck and the lower back and have him come back in to discuss the findings.  I told him to work on more of the strengthening exercises at [facility].        Diagnoses and all orders for this visit:    1. Spinal stenosis of lumbar region with neurogenic claudication (Primary)  -     MRI Lumbar Spine With & Without Contrast; Future  -     XR Spine Lumbar Complete With Flex & Ext; Future    2. Spondylolisthesis at L4-L5 level  -     MRI Lumbar Spine With & Without Contrast; Future  -     XR Spine Lumbar Complete With Flex & Ext; Future    3. Calf muscle weakness  -     MRI Lumbar Spine With & Without Contrast; Future  -     XR Spine Lumbar  Complete With Flex & Ext; Future    4. Chronic neck pain  -     XR spine cervical ap and lat w flex and ext; Future      Return in about 6 weeks (around 6/3/2024) for After MRI and x-ray.

## 2024-04-22 ENCOUNTER — OFFICE VISIT (OUTPATIENT)
Dept: NEUROSURGERY | Facility: CLINIC | Age: 79
End: 2024-04-22
Payer: MEDICARE

## 2024-04-22 VITALS
SYSTOLIC BLOOD PRESSURE: 122 MMHG | DIASTOLIC BLOOD PRESSURE: 80 MMHG | HEIGHT: 75 IN | WEIGHT: 186 LBS | RESPIRATION RATE: 20 BRPM | BODY MASS INDEX: 23.13 KG/M2

## 2024-04-22 DIAGNOSIS — M48.062 SPINAL STENOSIS OF LUMBAR REGION WITH NEUROGENIC CLAUDICATION: Primary | ICD-10-CM

## 2024-04-22 DIAGNOSIS — M54.2 CHRONIC NECK PAIN: ICD-10-CM

## 2024-04-22 DIAGNOSIS — M43.16 SPONDYLOLISTHESIS AT L4-L5 LEVEL: ICD-10-CM

## 2024-04-22 DIAGNOSIS — G89.29 CHRONIC NECK PAIN: ICD-10-CM

## 2024-04-22 DIAGNOSIS — M62.81 CALF MUSCLE WEAKNESS: ICD-10-CM

## 2024-04-22 PROCEDURE — 1160F RVW MEDS BY RX/DR IN RCRD: CPT | Performed by: NEUROLOGICAL SURGERY

## 2024-04-22 PROCEDURE — 1159F MED LIST DOCD IN RCRD: CPT | Performed by: NEUROLOGICAL SURGERY

## 2024-04-22 PROCEDURE — 99214 OFFICE O/P EST MOD 30 MIN: CPT | Performed by: NEUROLOGICAL SURGERY

## 2024-05-10 ENCOUNTER — HOSPITAL ENCOUNTER (OUTPATIENT)
Dept: MRI IMAGING | Facility: HOSPITAL | Age: 79
Discharge: HOME | End: 2024-05-10
Payer: MEDICARE

## 2024-05-10 ENCOUNTER — HOSPITAL ENCOUNTER (OUTPATIENT)
Dept: GENERAL RADIOLOGY | Facility: HOSPITAL | Age: 79
Discharge: HOME | End: 2024-05-10
Payer: MEDICARE

## 2024-05-10 DIAGNOSIS — M43.16 SPONDYLOLISTHESIS AT L4-L5 LEVEL: ICD-10-CM

## 2024-05-10 DIAGNOSIS — M62.81 CALF MUSCLE WEAKNESS: ICD-10-CM

## 2024-05-10 DIAGNOSIS — M54.2 CHRONIC NECK PAIN: ICD-10-CM

## 2024-05-10 DIAGNOSIS — G89.29 CHRONIC NECK PAIN: ICD-10-CM

## 2024-05-10 DIAGNOSIS — M48.062 SPINAL STENOSIS OF LUMBAR REGION WITH NEUROGENIC CLAUDICATION: ICD-10-CM

## 2024-05-10 PROCEDURE — 0 GADOBENATE DIMEGLUMINE 529 MG/ML SOLUTION: Performed by: NEUROLOGICAL SURGERY

## 2024-05-10 PROCEDURE — 72050 X-RAY EXAM NECK SPINE 4/5VWS: CPT

## 2024-05-10 PROCEDURE — A9577 INJ MULTIHANCE: HCPCS | Performed by: NEUROLOGICAL SURGERY

## 2024-05-10 PROCEDURE — 72114 X-RAY EXAM L-S SPINE BENDING: CPT

## 2024-05-10 PROCEDURE — 72158 MRI LUMBAR SPINE W/O & W/DYE: CPT

## 2024-05-10 RX ADMIN — GADOBENATE DIMEGLUMINE 20 ML: 529 INJECTION, SOLUTION INTRAVENOUS at 14:42

## 2024-05-13 LAB — CREAT BLDA-MCNC: 1.1 MG/DL (ref 0.6–1.3)

## 2024-07-22 ENCOUNTER — OFFICE VISIT (OUTPATIENT)
Dept: NEUROSURGERY | Facility: CLINIC | Age: 79
End: 2024-07-22
Payer: MEDICARE

## 2024-07-22 VITALS
DIASTOLIC BLOOD PRESSURE: 76 MMHG | WEIGHT: 186 LBS | RESPIRATION RATE: 20 BRPM | BODY MASS INDEX: 23.13 KG/M2 | HEIGHT: 75 IN | SYSTOLIC BLOOD PRESSURE: 124 MMHG

## 2024-07-22 DIAGNOSIS — G89.29 CHRONIC NECK PAIN: ICD-10-CM

## 2024-07-22 DIAGNOSIS — M43.16 SPONDYLOLISTHESIS AT L4-L5 LEVEL: ICD-10-CM

## 2024-07-22 DIAGNOSIS — M54.2 CHRONIC NECK PAIN: ICD-10-CM

## 2024-07-22 DIAGNOSIS — M62.81 CALF MUSCLE WEAKNESS: ICD-10-CM

## 2024-07-22 DIAGNOSIS — M48.062 SPINAL STENOSIS OF LUMBAR REGION WITH NEUROGENIC CLAUDICATION: Primary | ICD-10-CM

## 2024-07-22 NOTE — PROGRESS NOTES
"Subjective   Patient ID: hJonatan Norman is a 78 y.o. male is here today for follow-up after MRI 05/10/24    His latest MRI of the lumbar spine showed no progression of his stenosis. He's been working out at [employer] and seems to be gaining strength back in the right leg. He has a chronic left gastroc weakenss. Discussed the study. He'll continue his exercise program. No significant pain. No surgical considerations for now.     History of Present Illness    The following portions of the patient's history were reviewed and updated as appropriate: allergies, current medications, past family history, past medical history, past social history, past surgical history, and problem list.    Review of Systems   Constitutional:  Negative for fever.   Musculoskeletal:  Positive for neck pain.   Neurological:  Positive for weakness (left foot) and numbness (feet).   All other systems reviewed and are negative.          Objective     Vitals:    07/22/24 1352   BP: 124/76   BP Location: Left arm   Patient Position: Sitting   Cuff Size: Adult   Resp: 20   Weight: 84.4 kg (186 lb)   Height: 190.5 cm (75\")   PainSc: 0-No pain     Body mass index is 23.25 kg/m².    Tobacco Use: Medium Risk (7/22/2024)    Patient History     Smoking Tobacco Use: Former     Smokeless Tobacco Use: Never     Passive Exposure: Not on file          Physical Exam  Constitutional:       Appearance: He is well-developed.   HENT:      Head: Normocephalic and atraumatic.   Eyes:      Extraocular Movements: EOM normal.      Conjunctiva/sclera: Conjunctivae normal.      Pupils: Pupils are equal, round, and reactive to light.   Neck:      Vascular: No carotid bruit.   Neurological:      Mental Status: He is oriented to person, place, and time.      Coordination: Finger-Nose-Finger Test and Heel to Shin Test normal.      Gait: Gait is intact.      Deep Tendon Reflexes:      Reflex Scores:       Tricep reflexes are 2+ on the right side and 2+ on the left side.      "  Bicep reflexes are 2+ on the right side and 2+ on the left side.       Brachioradialis reflexes are 2+ on the right side and 2+ on the left side.       Patellar reflexes are 2+ on the right side and 2+ on the left side.       Achilles reflexes are 2+ on the right side and 2+ on the left side.  Psychiatric:         Speech: Speech normal.       Neurologic Exam     Mental Status   Oriented to person, place, and time.   Registration of memory: Good recent and remote memory.   Attention: normal. Concentration: normal.   Speech: speech is normal   Level of consciousness: alert  Knowledge: consistent with education.     Cranial Nerves     CN II   Visual fields full to confrontation.   Visual acuity: normal    CN III, IV, VI   Pupils are equal, round, and reactive to light.  Extraocular motions are normal.     CN V   Facial sensation intact.   Right corneal reflex: normal  Left corneal reflex: normal    CN VII   Facial expression full, symmetric.   Right facial weakness: none  Left facial weakness: none    CN VIII   Hearing: intact    CN IX, X   Palate: symmetric    CN XI   Right sternocleidomastoid strength: normal  Left sternocleidomastoid strength: normal    CN XII   Tongue: not atrophic  Tongue deviation: none    Motor Exam   Muscle bulk: normal  Right arm tone: normal  Left arm tone: normal  Right leg tone: normal  Left leg tone: normal    Strength   Strength 5/5 except as noted.     Sensory Exam   Light touch normal.     Gait, Coordination, and Reflexes     Gait  Gait: normal    Coordination   Finger to nose coordination: normal  Heel to shin coordination: normal    Reflexes   Right brachioradialis: 2+  Left brachioradialis: 2+  Right biceps: 2+  Left biceps: 2+  Right triceps: 2+  Left triceps: 2+  Right patellar: 2+  Left patellar: 2+  Right achilles: 2+  Left achilles: 2+  Right : 2+  Left : 2+          Assessment & Plan   Independent Review of Radiographic Studies:      I personally reviewed the images  from the following studies.    I reviewed the lumbar MRI and xrays done 5/10/24 which shows no significant change since 2020. Still has severe stenosis at L4/5 and L5/S1 with a slip at L4/5. No instability on cervical and lumbar xrays.      Medical Decision Making:      Clinically stable, Will see him in nine months. He'll continue his exercise program at [facility].     Diagnoses and all orders for this visit:    1. Spinal stenosis of lumbar region with neurogenic claudication (Primary)    2. Spondylolisthesis at L4-L5 level    3. Calf muscle weakness    4. Chronic neck pain      Return in about 9 months (around 4/22/2025) for face to face.

## 2025-04-04 NOTE — PROGRESS NOTES
Subjective   Patient ID: Jhonatan Norman is a 79 y.o. adult is here today for 9 month follow-up.    Today patient states he is experiencing some ankle swelling (both), along w/ numbness and tingling.  History of Present Illness    I last saw him about 9 months ago.  Things have been about the same although he has been having some issues in terms of his reflux disease.  He still does not have significant pain in his back or his legs.  He has chronic foot drop on the left and chronic calf weakness on the left.  Nothing much is really changed.  I told him we continue to follow him.  I see no need to consider a decompression and fusion on him.  He still taking care of his wife which produces a lot of stress.  I will see him in 1 year with x-rays.    The following portions of the patient's history were reviewed and updated as appropriate: allergies, current medications, past family history, past medical history, past social history, past surgical history, and problem list.    Review of Systems   All other systems reviewed and are negative.      Objective   Physical Exam  Constitutional:       General: He is awake.      Appearance: He is well-developed.   HENT:      Head: Normocephalic and atraumatic.   Eyes:      General: Lids are normal.      Extraocular Movements: Extraocular movements intact.      Conjunctiva/sclera: Conjunctivae normal.      Pupils: Pupils are equal, round, and reactive to light.   Neck:      Vascular: No carotid bruit.   Neurological:      Mental Status: He is alert.      Coordination: Coordination is intact.      Deep Tendon Reflexes:      Reflex Scores:       Tricep reflexes are 2+ on the right side and 2+ on the left side.       Bicep reflexes are 2+ on the right side and 2+ on the left side.       Brachioradialis reflexes are 2+ on the right side and 2+ on the left side.       Patellar reflexes are 2+ on the right side and 2+ on the left side.       Achilles reflexes are 2+ on the right side and  2+ on the left side.  Psychiatric:         Speech: Speech normal.       Neurological Exam  Mental Status  Awake and alert. Oriented only to person, place, time and situation. Recent and remote memory are intact. Speech is normal. Language is fluent with no aphasia. Attention and concentration are normal. Fund of knowledge is appropriate for level of education.    Cranial Nerves  CN II: Visual acuity is normal. Visual fields full to confrontation.  CN III, IV, VI: Extraocular movements intact bilaterally. Normal lids and orbits bilaterally. Pupils equal round and reactive to light bilaterally.  CN V: Facial sensation is normal.  CN VII: Full and symmetric facial movement.  CN IX, X: Palate elevates symmetrically. Normal gag reflex.  CN XI: Shoulder shrug strength is normal.  CN XII: Tongue midline without atrophy or fasciculations.    Motor  Normal muscle bulk throughout. Normal muscle tone.                                               Right                     Left  Rhomboids                            5                          5  Infraspinatus                          5                          5  Supraspinatus                       5                          5  Deltoid                                   5                          5   Biceps                                   5                          5  Brachioradialis                      5                          5   Triceps                                  5                          5   Pronator                                5                          5   Supinator                              5                           5   Wrist flexor                            5                          5   Wrist extensor                       5                          5   Finger flexor                          5                          5   Finger extensor                     5                          5   Interossei                              5                           5   Abductor pollicis brevis         5                          5   Flexor pollicis brevis             5                          5   Opponens pollicis                 5                          5  Extensor digitorum               5                          5  Abductor digiti minimi           5                          5   Abdominal                            5                          5  Glutei                                    5                          5  Hip abductor                         5                          5  Hip adductor                         5                          5   Iliopsoas                               5                          5   Quadriceps                           5                          5   Hamstring                             5                          5   Gastrocnemius                     5                           4   Anterior tibialis                      5                          4   Posterior tibialis                    5                          5   Peroneal                               5                          5  Ankle dorsiflexor                   5                          5  Ankle plantar flexor              5                           5  Extensor hallucis longus      5                           5    Sensory  Light touch is normal in upper and lower extremities. Proprioception is normal in upper and lower extremities.     Reflexes                                            Right                      Left  Brachioradialis                    2+                         2+  Biceps                                 2+                         2+  Triceps                                2+                         2+  Finger flex                           2+                         2+  Hamstring                            2+                         2+  Patellar                                2+                         2+  Achilles                                2+                          2+    Coordination    Finger-to-nose, rapid alternating movements and heel-to-shin normal bilaterally without dysmetria.    Gait  Casual gait is normal including stance, stride, and arm swing.Normal toe walking. Normal heel walking. Normal tandem gait.       Assessment & Plan   Independent Review of Radiographic Studies:      I reviewed the lumbar MRI and xrays done 5/10/24 which shows no significant change since 2020. Still has severe stenosis at L4/5 and L5/S1 with a slip at L4/5. No instability on cervical and lumbar xrays.     Medical Decision Making:      Will continue to see him and bring him in in 1 year with x-rays.  He will continue his exercises.  There is severe recurrent pain or increasing weakness, he will let us know.  Next  Diagnoses and all orders for this visit:    1. Calf muscle weakness (Primary)    2. Spinal stenosis of lumbar region with neurogenic claudication  -     XR Spine Lumbar Complete With Flex & Ext; Future    3. Spondylolisthesis at L4-L5 level  -     XR Spine Lumbar Complete With Flex & Ext; Future    4. Foot drop, left      Return in about 1 year (around 4/7/2026) for Face-to-face.

## 2025-04-07 ENCOUNTER — OFFICE VISIT (OUTPATIENT)
Dept: NEUROSURGERY | Facility: CLINIC | Age: 80
End: 2025-04-07
Payer: MEDICARE

## 2025-04-07 VITALS — SYSTOLIC BLOOD PRESSURE: 118 MMHG | HEART RATE: 60 BPM | DIASTOLIC BLOOD PRESSURE: 70 MMHG | OXYGEN SATURATION: 98 %

## 2025-04-07 DIAGNOSIS — M62.81 CALF MUSCLE WEAKNESS: Primary | ICD-10-CM

## 2025-04-07 DIAGNOSIS — M43.16 SPONDYLOLISTHESIS AT L4-L5 LEVEL: ICD-10-CM

## 2025-04-07 DIAGNOSIS — M21.372 FOOT DROP, LEFT: ICD-10-CM

## 2025-04-07 DIAGNOSIS — M48.062 SPINAL STENOSIS OF LUMBAR REGION WITH NEUROGENIC CLAUDICATION: ICD-10-CM

## 2025-04-07 PROCEDURE — 1160F RVW MEDS BY RX/DR IN RCRD: CPT | Performed by: NEUROLOGICAL SURGERY

## 2025-04-07 PROCEDURE — 1159F MED LIST DOCD IN RCRD: CPT | Performed by: NEUROLOGICAL SURGERY

## 2025-04-07 PROCEDURE — 99213 OFFICE O/P EST LOW 20 MIN: CPT | Performed by: NEUROLOGICAL SURGERY

## 2025-04-07 RX ORDER — MUPIROCIN 20 MG/G
OINTMENT TOPICAL
COMMUNITY
Start: 2024-11-11

## (undated) DEVICE — PATIENT RETURN ELECTRODE, SINGLE-USE, CONTACT QUALITY MONITORING, ADULT, WITH 9FT CORD, FOR PATIENTS WEIGING OVER 33LBS. (15KG): Brand: MEGADYNE

## (undated) DEVICE — ENDOPATH PNEUMONEEDLE INSUFFLATION NEEDLES WITH LUER LOCK CONNECTORS 120MM: Brand: ENDOPATH

## (undated) DEVICE — CANN NASL CO2 TRULINK W/O2 A/

## (undated) DEVICE — DISPOSABLE MONOPOLAR ENDOSCOPIC CORD 10 FT. (3M): Brand: KIRWAN

## (undated) DEVICE — SUT VIC 0/0 UR6 27IN DYED J603H

## (undated) DEVICE — SENSR O2 OXIMAX FNGR A/ 18IN NONSTR

## (undated) DEVICE — COLLECTION SOCK, GENERAL: Brand: DEROYAL

## (undated) DEVICE — TBG 02 CRUSH RESIST LF CLR 7FT

## (undated) DEVICE — ADAPT CLN BIOGUARD AIR/H2O DISP

## (undated) DEVICE — GOWN,NON-REINFORCED,SIRUS,SET IN SLV,XL: Brand: MEDLINE

## (undated) DEVICE — DEV SUT GRSPR CLOSUR 15CM 14G

## (undated) DEVICE — PK ENT FESS 40

## (undated) DEVICE — HDRST POSITIONING FM RND 2X9IN

## (undated) DEVICE — APPL CHLORAPREP HI/LITE 26ML ORNG

## (undated) DEVICE — LN SMPL CO2 SHTRM SD STREAM W/M LUER

## (undated) DEVICE — HARMONIC ACE +7 LAPAROSCOPIC SHEARS ADVANCED HEMOSTASIS 5MM DIAMETER 36CM SHAFT LENGTH  FOR USE WITH GRAY HAND PIECE ONLY: Brand: HARMONIC ACE

## (undated) DEVICE — GLV SURG TRIUMPH CLASSIC PF LTX 6.5 STRL

## (undated) DEVICE — GLV SURG TRIUMPH CLASSIC PF LTX 7.5 STRL

## (undated) DEVICE — LAPAROSCOPIC SMOKE FILTRATION SYSTEM: Brand: PALL LAPAROSHIELD® PLUS LAPAROSCOPIC SMOKE FILTRATION SYSTEM

## (undated) DEVICE — BITEBLOCK OMNI BLOC

## (undated) DEVICE — TUBING, SUCTION, 1/4" X 10', STRAIGHT: Brand: MEDLINE

## (undated) DEVICE — SOL NACL 0.9PCT 1000ML

## (undated) DEVICE — FRCP BX RADJAW4 NDL 2.8 240CM LG OG BX40

## (undated) DEVICE — CATH IV INSYTE AUTOGARD 14G 1 1/2IN ORNG

## (undated) DEVICE — GLV SURG PREMIERPRO ORTHO LTX PF SZ7.5 BRN

## (undated) DEVICE — COVER,MAYO STAND,STERILE: Brand: MEDLINE

## (undated) DEVICE — ENDOPOUCH RETRIEVER SPECIMEN RETRIEVAL BAGS: Brand: ENDOPOUCH RETRIEVER

## (undated) DEVICE — ENDOCUT SCISSOR TIP, DISPOSABLE: Brand: RENEW

## (undated) DEVICE — DRESSING 440402 MEROCEL 10PK STD 8CM: Brand: MEROCEL

## (undated) DEVICE — ENDOPATH XCEL BLADELESS TROCARS WITH STABILITY SLEEVES: Brand: ENDOPATH XCEL

## (undated) DEVICE — SUT SILK 3/0 FS1 18IN 684G

## (undated) DEVICE — Device: Brand: DEFENDO AIR/WATER/SUCTION AND BIOPSY VALVE

## (undated) DEVICE — CANN O2 ETCO2 FITS ALL CONN CO2 SMPL A/ 7IN DISP LF

## (undated) DEVICE — BLADE 1884004 TRICUT 5PK 4MM: Brand: TRICUT®

## (undated) DEVICE — MAGNETIC DRAPE: Brand: DEVON

## (undated) DEVICE — SUT MNCRYL PLS ANTIB UD 4/0 PS2 18IN

## (undated) DEVICE — NDL HYPO PRECISIONGLIDE REG 25G 1 1/2

## (undated) DEVICE — MASK,OXY,ADLT,NON-REB,SAFETY VENT,7,UC: Brand: MEDLINE

## (undated) DEVICE — ANTI-FOG SOLUTION WITH FOAM PAD: Brand: DEVON

## (undated) DEVICE — SYR LL TP 10ML STRL

## (undated) DEVICE — LOU LAP CHOLE: Brand: MEDLINE INDUSTRIES, INC.

## (undated) DEVICE — ECHELON FLEX45 ENDOPATH STAPLER, ARTICULATING ENDOSCOPIC LINEAR CUTTER (NO CARTRIDGE): Brand: ECHELON ENDOPATH

## (undated) DEVICE — 3M™ STERI-STRIP™ REINFORCED ADHESIVE SKIN CLOSURES, R1547, 1/2 IN X 4 IN (12 MM X 100 MM), 6 STRIPS/ENVELOPE: Brand: 3M™ STERI-STRIP™

## (undated) DEVICE — SUT SILK 3/0 FS1 18IN 684H

## (undated) DEVICE — KT ORCA ORCAPOD DISP STRL

## (undated) DEVICE — TOWEL,OR,DSP,ST,BLUE,STD,4/PK,20PK/CS: Brand: MEDLINE

## (undated) DEVICE — ADHS SKIN SURG TISS VISC PREMIERPRO EXOFIN HI/VISC FAST/DRY

## (undated) DEVICE — ENDOPATH XCEL UNIVERSAL TROCAR STABLILITY SLEEVES: Brand: ENDOPATH XCEL